# Patient Record
Sex: MALE | Race: WHITE | Employment: PART TIME
[De-identification: names, ages, dates, MRNs, and addresses within clinical notes are randomized per-mention and may not be internally consistent; named-entity substitution may affect disease eponyms.]

---

## 2022-06-16 ENCOUNTER — OFFICE VISIT (OUTPATIENT)
Dept: INTERNAL MEDICINE CLINIC | Facility: CLINIC | Age: 52
End: 2022-06-16
Payer: MEDICAID

## 2022-06-16 VITALS
TEMPERATURE: 96.8 F | DIASTOLIC BLOOD PRESSURE: 90 MMHG | HEART RATE: 100 BPM | OXYGEN SATURATION: 98 % | SYSTOLIC BLOOD PRESSURE: 130 MMHG | HEIGHT: 68 IN | WEIGHT: 170 LBS | BODY MASS INDEX: 25.76 KG/M2

## 2022-06-16 DIAGNOSIS — Z13.1 SCREENING FOR DIABETES MELLITUS: ICD-10-CM

## 2022-06-16 DIAGNOSIS — F51.01 PRIMARY INSOMNIA: ICD-10-CM

## 2022-06-16 DIAGNOSIS — C67.9 MALIGNANT NEOPLASM OF URINARY BLADDER, UNSPECIFIED SITE (HCC): ICD-10-CM

## 2022-06-16 DIAGNOSIS — Z12.11 COLON CANCER SCREENING: ICD-10-CM

## 2022-06-16 DIAGNOSIS — E66.3 OVERWEIGHT: ICD-10-CM

## 2022-06-16 DIAGNOSIS — Z11.59 ENCOUNTER FOR HEPATITIS C SCREENING TEST FOR LOW RISK PATIENT: ICD-10-CM

## 2022-06-16 DIAGNOSIS — Z78.9 ALCOHOL USE: ICD-10-CM

## 2022-06-16 DIAGNOSIS — R03.0 ELEVATED BLOOD PRESSURE READING: ICD-10-CM

## 2022-06-16 DIAGNOSIS — G89.29 CHRONIC LEFT SHOULDER PAIN: Primary | ICD-10-CM

## 2022-06-16 DIAGNOSIS — Z72.0 TOBACCO ABUSE: ICD-10-CM

## 2022-06-16 DIAGNOSIS — M25.512 CHRONIC LEFT SHOULDER PAIN: Primary | ICD-10-CM

## 2022-06-16 PROBLEM — F10.90 ALCOHOL USE: Status: ACTIVE | Noted: 2022-06-16

## 2022-06-16 PROCEDURE — 99204 OFFICE O/P NEW MOD 45 MIN: CPT | Performed by: NURSE PRACTITIONER

## 2022-06-16 ASSESSMENT — PATIENT HEALTH QUESTIONNAIRE - PHQ9
SUM OF ALL RESPONSES TO PHQ QUESTIONS 1-9: 0
1. LITTLE INTEREST OR PLEASURE IN DOING THINGS: 0
SUM OF ALL RESPONSES TO PHQ9 QUESTIONS 1 & 2: 0
2. FEELING DOWN, DEPRESSED OR HOPELESS: 0
SUM OF ALL RESPONSES TO PHQ QUESTIONS 1-9: 0

## 2022-06-16 ASSESSMENT — ANXIETY QUESTIONNAIRES
5. BEING SO RESTLESS THAT IT IS HARD TO SIT STILL: 2
3. WORRYING TOO MUCH ABOUT DIFFERENT THINGS: 3
6. BECOMING EASILY ANNOYED OR IRRITABLE: 2
GAD7 TOTAL SCORE: 13
2. NOT BEING ABLE TO STOP OR CONTROL WORRYING: 3
1. FEELING NERVOUS, ANXIOUS, OR ON EDGE: 0
IF YOU CHECKED OFF ANY PROBLEMS ON THIS QUESTIONNAIRE, HOW DIFFICULT HAVE THESE PROBLEMS MADE IT FOR YOU TO DO YOUR WORK, TAKE CARE OF THINGS AT HOME, OR GET ALONG WITH OTHER PEOPLE: SOMEWHAT DIFFICULT
7. FEELING AFRAID AS IF SOMETHING AWFUL MIGHT HAPPEN: 0
4. TROUBLE RELAXING: 3

## 2022-06-16 NOTE — PROGRESS NOTES
Doe Art (:  1970) is a 46 y.o. male,New patient, here for evaluation of the following chief complaint(s):  New Patient         ASSESSMENT/PLAN:  1. Chronic left shoulder pain  -     XR SHOULDER LEFT (MIN 2 VIEWS); Future  -     [facility]  -     Hemoglobin A1C  2. Screening for diabetes mellitus  -     Hemoglobin A1C  3. Elevated blood pressure reading  -     CBC with Auto Differential  -     Comprehensive Metabolic Panel  -     TSH  4. Overweight  -     Lipid Panel  5. Malignant neoplasm of urinary bladder, unspecified site [facility])  -     External Referral to Urology  6. Encounter for hepatitis C screening test for low risk patient  -     Hepatitis C Antibody  7. Colon cancer screening  -     POCT FECAL IMMUNOCHEMICAL TEST (FIT) ()  8. Tobacco abuse  9. Alcohol use      No follow-ups on file. Chronic left shoulder pain, limited overhead ROM  Get xray and refer to PT  Re-establish with urology to follow prior bladder cancer  Check lab  Counseled on tobacco cessation  Work on decreasing alcohol  Counseled on sleep hygiene  F.u after lab      Subjective   SUBJECTIVE/OBJECTIVE:  Patient is here for new patient appt. He has hx of bladder cancer. He no longer follows with oncology or urology. He had a bladder stone a few years ago - he had stone removed and was told there was no further evidence of cancer. He didn't do chemo. He has been having left shoulder pain, pinching sensation that is worsening. It is worse when he works, he is off this week and isn't so bad. He also can't sleep at night. He lays down and his mind starts wandering and won't shut off. It takes 4-5 hours to fall asleep.  He is up and down at night, gets up to eat, lays down, watches tv, lays back down, falls asleep in front of tv, gets in bed but then he is awake, etc.       Shoulder Pain   The pain is present in the right shoulder and left shoulder (right is just starting to hurt, left is bad). This is a chronic problem. The current episode started more than 1 year ago. There has been no history of extremity trauma. The problem occurs constantly. The problem has been gradually worsening. Quality: sharp pinching pain. Associated symptoms include a limited range of motion and stiffness. Pertinent negatives include no fever, joint locking, numbness or tingling. The symptoms are aggravated by activity (lifting and moving left arm). He has tried NSAIDS and acetaminophen for the symptoms. The treatment provided moderate relief. Review of Systems   Constitutional: Negative for fever. Musculoskeletal: Positive for stiffness. Neurological: Negative for tingling and numbness. Objective   Physical Exam  Constitutional:       Appearance: Normal appearance. He is not ill-appearing. HENT:      Head: Normocephalic. Right Ear: External ear normal.      Left Ear: External ear normal.   Eyes:      Extraocular Movements: Extraocular movements intact. Pupils: Pupils are equal, round, and reactive to light. Cardiovascular:      Rate and Rhythm: Normal rate and regular rhythm. Pulmonary:      Effort: Pulmonary effort is normal.      Breath sounds: Normal breath sounds. Musculoskeletal:         General: Tenderness present. Left shoulder: No tenderness or bony tenderness. Decreased range of motion. Normal strength. Normal pulse. Cervical back: Neck supple. Skin:     General: Skin is warm and dry. Neurological:      General: No focal deficit present. Mental Status: He is alert and oriented to person, place, and time. Psychiatric:         Mood and Affect: Mood normal.         Behavior: Behavior normal.                  An electronic signature was used to authenticate this note.     --LINDA Kunz - CNP

## 2022-06-17 LAB
ALBUMIN SERPL-MCNC: 3.8 G/DL (ref 3.5–5)
ALBUMIN/GLOB SERPL: 0.9 {RATIO} (ref 1.2–3.5)
ALP SERPL-CCNC: 160 U/L (ref 50–136)
ALT SERPL-CCNC: 110 U/L (ref 12–65)
ANION GAP SERPL CALC-SCNC: 10 MMOL/L (ref 7–16)
AST SERPL-CCNC: 133 U/L (ref 15–37)
BASOPHILS # BLD: 0.1 K/UL (ref 0–0.2)
BASOPHILS NFR BLD: 1 % (ref 0–2)
BILIRUB SERPL-MCNC: 1.5 MG/DL (ref 0.2–1.1)
BUN SERPL-MCNC: 7 MG/DL (ref 6–23)
CALCIUM SERPL-MCNC: 9.2 MG/DL (ref 8.3–10.4)
CHLORIDE SERPL-SCNC: 104 MMOL/L (ref 98–107)
CHOLEST SERPL-MCNC: 185 MG/DL
CO2 SERPL-SCNC: 25 MMOL/L (ref 21–32)
CREAT SERPL-MCNC: 0.9 MG/DL (ref 0.8–1.5)
DIFFERENTIAL METHOD BLD: ABNORMAL
EOSINOPHIL # BLD: 0.2 K/UL (ref 0–0.8)
EOSINOPHIL NFR BLD: 2 % (ref 0.5–7.8)
ERYTHROCYTE [DISTWIDTH] IN BLOOD BY AUTOMATED COUNT: 11.9 % (ref 11.9–14.6)
EST. AVERAGE GLUCOSE BLD GHB EST-MCNC: 103 MG/DL
GLOBULIN SER CALC-MCNC: 4.3 G/DL (ref 2.3–3.5)
GLUCOSE SERPL-MCNC: 107 MG/DL (ref 65–100)
HBA1C MFR BLD: 5.2 % (ref 4.2–6.3)
HCT VFR BLD AUTO: 49.1 % (ref 41.1–50.3)
HCV AB SER QL: REACTIVE
HDLC SERPL-MCNC: 23 MG/DL (ref 40–60)
HDLC SERPL: 8 {RATIO}
HGB BLD-MCNC: 16.8 G/DL (ref 13.6–17.2)
IMM GRANULOCYTES # BLD AUTO: 0 K/UL (ref 0–0.5)
IMM GRANULOCYTES NFR BLD AUTO: 0 % (ref 0–5)
LDLC SERPL CALC-MCNC: 128.6 MG/DL
LYMPHOCYTES # BLD: 2.2 K/UL (ref 0.5–4.6)
LYMPHOCYTES NFR BLD: 20 % (ref 13–44)
MCH RBC QN AUTO: 35.4 PG (ref 26.1–32.9)
MCHC RBC AUTO-ENTMCNC: 34.2 G/DL (ref 31.4–35)
MCV RBC AUTO: 103.4 FL (ref 79.6–97.8)
MONOCYTES # BLD: 1.2 K/UL (ref 0.1–1.3)
MONOCYTES NFR BLD: 11 % (ref 4–12)
NEUTS SEG # BLD: 7.3 K/UL (ref 1.7–8.2)
NEUTS SEG NFR BLD: 66 % (ref 43–78)
NRBC # BLD: 0 K/UL (ref 0–0.2)
PLATELET # BLD AUTO: 249 K/UL (ref 150–450)
PMV BLD AUTO: 11.5 FL (ref 9.4–12.3)
POTASSIUM SERPL-SCNC: 5.1 MMOL/L (ref 3.5–5.1)
PROT SERPL-MCNC: 8.1 G/DL (ref 6.3–8.2)
RBC # BLD AUTO: 4.75 M/UL (ref 4.23–5.6)
SODIUM SERPL-SCNC: 139 MMOL/L (ref 136–145)
TRIGL SERPL-MCNC: 167 MG/DL (ref 35–150)
TSH, 3RD GENERATION: 1.98 UIU/ML (ref 0.36–3.74)
VLDLC SERPL CALC-MCNC: 33.4 MG/DL (ref 6–23)
WBC # BLD AUTO: 11 K/UL (ref 4.3–11.1)

## 2022-06-20 DIAGNOSIS — R79.89 ELEVATED LIVER FUNCTION TESTS: ICD-10-CM

## 2022-06-20 DIAGNOSIS — R76.8 HEPATITIS C ANTIBODY TEST POSITIVE: Primary | ICD-10-CM

## 2022-06-20 DIAGNOSIS — M25.512 CHRONIC LEFT SHOULDER PAIN: ICD-10-CM

## 2022-06-20 DIAGNOSIS — G89.29 CHRONIC LEFT SHOULDER PAIN: ICD-10-CM

## 2022-06-20 DIAGNOSIS — Z78.9 ALCOHOL USE: ICD-10-CM

## 2022-06-20 PROCEDURE — 73030 X-RAY EXAM OF SHOULDER: CPT | Performed by: NURSE PRACTITIONER

## 2022-06-30 ENCOUNTER — OFFICE VISIT (OUTPATIENT)
Dept: INTERNAL MEDICINE CLINIC | Facility: CLINIC | Age: 52
End: 2022-06-30
Payer: MEDICAID

## 2022-06-30 VITALS
SYSTOLIC BLOOD PRESSURE: 140 MMHG | WEIGHT: 174 LBS | HEART RATE: 69 BPM | HEIGHT: 68 IN | DIASTOLIC BLOOD PRESSURE: 84 MMHG | BODY MASS INDEX: 26.37 KG/M2 | TEMPERATURE: 97.2 F | OXYGEN SATURATION: 98 %

## 2022-06-30 DIAGNOSIS — Z78.9 ALCOHOL USE: ICD-10-CM

## 2022-06-30 DIAGNOSIS — I10 PRIMARY HYPERTENSION: ICD-10-CM

## 2022-06-30 DIAGNOSIS — R76.8 HEPATITIS C ANTIBODY TEST POSITIVE: ICD-10-CM

## 2022-06-30 DIAGNOSIS — R74.8 LIVER ENZYME ELEVATION: ICD-10-CM

## 2022-06-30 DIAGNOSIS — C67.9 MALIGNANT NEOPLASM OF URINARY BLADDER, UNSPECIFIED SITE (HCC): Primary | ICD-10-CM

## 2022-06-30 DIAGNOSIS — E78.2 MIXED HYPERLIPIDEMIA: ICD-10-CM

## 2022-06-30 PROCEDURE — 99214 OFFICE O/P EST MOD 30 MIN: CPT | Performed by: NURSE PRACTITIONER

## 2022-06-30 RX ORDER — AMLODIPINE BESYLATE 2.5 MG/1
2.5 TABLET ORAL DAILY
Qty: 30 TABLET | Refills: 5 | Status: SHIPPED | OUTPATIENT
Start: 2022-06-30

## 2022-06-30 ASSESSMENT — PATIENT HEALTH QUESTIONNAIRE - PHQ9
SUM OF ALL RESPONSES TO PHQ QUESTIONS 1-9: 0
1. LITTLE INTEREST OR PLEASURE IN DOING THINGS: 0
SUM OF ALL RESPONSES TO PHQ QUESTIONS 1-9: 0
2. FEELING DOWN, DEPRESSED OR HOPELESS: 0
SUM OF ALL RESPONSES TO PHQ QUESTIONS 1-9: 0
SUM OF ALL RESPONSES TO PHQ QUESTIONS 1-9: 0
SUM OF ALL RESPONSES TO PHQ9 QUESTIONS 1 & 2: 0

## 2022-07-11 ENCOUNTER — COMMUNITY OUTREACH (OUTPATIENT)
Dept: INTERNAL MEDICINE CLINIC | Facility: CLINIC | Age: 52
End: 2022-07-11

## 2022-07-11 NOTE — PROGRESS NOTES
Patient's HM shows they are overdue for Colorectal Screening. Care Everywhere and  files searched without success. No results to attach to order nor HM updated. FIT ordered.

## 2022-07-19 ENCOUNTER — HOSPITAL ENCOUNTER (OUTPATIENT)
Dept: PHYSICAL THERAPY | Age: 52
Setting detail: RECURRING SERIES
Discharge: HOME | End: 2022-07-22
Payer: MEDICAID

## 2022-07-19 PROCEDURE — 97162 PT EVAL MOD COMPLEX 30 MIN: CPT

## 2022-07-19 ASSESSMENT — PAIN SCALES - GENERAL: PAINLEVEL_OUTOF10: 0

## 2022-07-19 NOTE — PROGRESS NOTES
Morgan Alvarado  : 1970  [place]  Secondary:  [address] [address] [facility] [address]  Phone: [phone]  Fax: [phone] No data recorded  No data recorded    PT Visit Info: Total # of Visits to Date: 1    OUTPATIENT PHYSICAL THERAPY:OP NOTE TYPE: Treatment Note 2022       Episode  }Appt Desk              Treatment Diagnosis:  Pain in Left Shoulder (M25.512)  Stiffness of Left Shoulder, Not elsewhere classified (M25.612)  Medical/Referring Diagnosis:  Chronic left shoulder pain [M25.512, G89.29]  Referring Physician:  Erica Brown, *  MD Orders:  PT Eval and Treat   Date of Onset:  No data recorded   Allergies:   Patient has no known allergies. Restrictions/Precautions:  No data recordedNo data recorded   Interventions Planned (Treatment may consist of any combination of the following):    No data recorded   Subjective Comments:see initial evaluation     Initial:}    0/10Post Qa7seetm:       0/10  Medications Last Reviewed:  2022  Updated Objective Findings:   see evaluation   Treatment   THERAPEUTIC ACTIVITY: (see below for minutes): Therapeutic activities below to improve mobility, strength, balance and coordination. Required minimal visual, verbal, manual and tactile cues to improve independence and safety with daily activities. THERAPEUTIC EXERCISE: (see below for minutes): Exercises below to improve mobility, strength, balance and coordination. Required minimal verbal and manual cues to promote proper body alignment, promote proper body posture and promote proper body mechanics. Progressed resistance, range, repetitions and complexity of movement as indicated. NEUROMUSCULAR RE-ED: (see below for minutes): Neuromuscular re-education to restore normal body movement patterns.   MANUAL THERAPY: (see below for minutes): Joint mobilization and Soft tissue mobilization was utilized and necessary because of the patient's

## 2022-07-19 NOTE — PLAN OF CARE
Constance Alvaro  : 1970  [place]  Secondary:  Dax Esquivel @ [address] [facility] [address]  Phone: [phone]  Fax: [phone] Plan Frequency: 2x/week for 6-8 weeks  Plan of Care/Certification Expiration Date: 22    PT Visit Info: Total # of Visits to Date: 1    OUTPATIENT PHYSICAL THERAPY:OP NOTE TYPE: Initial Assessment and Discharge Summary 2022               Episode  Appt Desk         Treatment Diagnosis:  Pain in Left Shoulder (M25.512)  Stiffness of Left Shoulder, Not elsewhere classified (M25.612)  * No diagnoses found *  Medical/Referring Diagnosis:  Chronic left shoulder pain [M25.512, G89.29]  Referring Physician:  Chucho Rosa, *  MD Orders:  PT Eval and Treat   Return MD Appt:    Date of Onset:  No data recorded   Allergies:  Patient has no known allergies. Restrictions/Precautions:    No data recordedNo data recorded   Medications Last Reviewed:  2022     SUBJECTIVE   History of Injury/Illness (Reason for Referral): Pt presents with c/o chronic L shoulder pain that has been going on for a few years. 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 Sleep hurts when lying on L, especially with arm outstretched. The pain is worst after working overhead for a day. Pain ranges 0-8/10 but sleep is always problematic. His shoulder sometimes catches. Patient Stated Goal(s):  \"ease pain\"  Initial:     0/10 Post Session:     0/10  Past Medical History/Comorbidities:   Mr. Dora Hansen  has no past medical history on file. Mr. Dora Hansen  has a past surgical history that includes Bladder stone removal and other surgical history.   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 male who presents to Physical Therapy with signs and symptoms consistent with diagnosis of L shoulder pain and stiffness. Patient presents today with limited ROM, inc pain, dec joint mobility and scapular control. These deficits limit the patients ability to perform these tasks: work duties (extensive overhead tasks), sleeping on L side. Patient will benefit from skilled therapy to allow the patient to meet set goals and return to PLOF, in which the above noted functional activities were not impaired. Problem List: (Impacting functional limitations): Body Structures, Functions, Activity Limitations Requiring Skilled Therapeutic Intervention: Decreased functional mobility ; Decreased ROM; Decreased strength; Decreased endurance; Increased pain   Therapy Prognosis:   Therapy Prognosis: Good   Assessment Complexity:   Medium Complexity  PLAN   Effective Dates: 07/19/22   TO Plan of Care/Certification Expiration Date: 09/19/22   Frequency/Duration: Plan Frequency: 2x/week for 6-8 weeks   Interventions Planned (Treatment may consist of any combination of the following):    Current Treatment Recommendations: Strengthening; ROM; Functional mobility training;  Endurance training; Neuromuscular re-education; Manual Therapy - Soft Tissue Mobilization; Home exercise program; Therapeutic activities   Goals: (Goals have been discussed and agreed upon with patient.)  Short-Term Functional Goals: Time Frame: 4 weeks  Shoulder flx within 5 deg of contralateral for improved reaching  Max pain 6/10 for improved sleep  Discharge Goals: Time Frame: 8 weeks  Max pain 4/10 for improved sleep  Independent with HEP         Outcome Measure: Tool Used: Disabilities of the Arm, Shoulder and Hand (DASH) Questionnaire - Quick Version  7/19/22: 25/100    Medical Necessity:   Patient demonstrates   good   rehab potential due to higher previous functional level. Reason For Services/Other Comments:  Patient continues to require skilled intervention due to limited ROM, endurance/motor control, inc pain. Plan: (addendum 8/30) Discharge. Pt canceled only f/u appointment and has not called to reschedule. Plan to discharge. Total Duration:  Time In: 0930  Time Out: 12    Regarding Letty Toro therapy, I certify that the treatment plan above will be carried out by a therapist or under their direction.   Thank you for this referral,  Robyn Gordillo, PT     Referring Physician Signature: Chucho Rosa, * _______________________________ Date _____________        Post Session Pain  Charge Capture  PT Visit Info  POC Link  Treatment Note Link  MD Guidelines  [email or web address]

## 2022-07-20 DIAGNOSIS — Z12.11 SCREENING FOR COLON CANCER: Primary | ICD-10-CM

## 2022-08-25 ENCOUNTER — PREP FOR PROCEDURE (OUTPATIENT)
Dept: ADMINISTRATIVE | Age: 52
End: 2022-08-25

## 2022-09-03 RX ORDER — SODIUM CHLORIDE 9 MG/ML
INJECTION, SOLUTION INTRAVENOUS PRN
Status: CANCELLED | OUTPATIENT
Start: 2022-09-03

## 2022-09-03 RX ORDER — SODIUM CHLORIDE 0.9 % (FLUSH) 0.9 %
5-40 SYRINGE (ML) INJECTION PRN
Status: CANCELLED | OUTPATIENT
Start: 2022-09-03

## 2022-09-03 RX ORDER — SODIUM CHLORIDE 0.9 % (FLUSH) 0.9 %
5-40 SYRINGE (ML) INJECTION EVERY 12 HOURS SCHEDULED
Status: CANCELLED | OUTPATIENT
Start: 2022-09-03

## 2022-09-12 ENCOUNTER — HOSPITAL ENCOUNTER (OUTPATIENT)
Dept: ULTRASOUND IMAGING | Age: 52
Discharge: HOME | End: 2022-09-15
Payer: MEDICAID

## 2022-09-12 VITALS
TEMPERATURE: 97.2 F | BODY MASS INDEX: 26.37 KG/M2 | RESPIRATION RATE: 16 BRPM | DIASTOLIC BLOOD PRESSURE: 72 MMHG | HEART RATE: 58 BPM | WEIGHT: 174 LBS | SYSTOLIC BLOOD PRESSURE: 127 MMHG | HEIGHT: 68 IN | OXYGEN SATURATION: 97 %

## 2022-09-12 DIAGNOSIS — K70.30 ALCOHOLIC CIRRHOSIS, UNSPECIFIED WHETHER ASCITES PRESENT (HCC): ICD-10-CM

## 2022-09-12 PROCEDURE — 2720000010 US BIOPSY LIVER PERCUTANEOUS

## 2022-09-12 PROCEDURE — 88312 SPECIAL STAINS GROUP 1: CPT

## 2022-09-12 PROCEDURE — 47000 NEEDLE BIOPSY OF LIVER PERQ: CPT | Performed by: INTERNAL MEDICINE

## 2022-09-12 PROCEDURE — 88313 SPECIAL STAINS GROUP 2: CPT

## 2022-09-12 PROCEDURE — 88307 TISSUE EXAM BY PATHOLOGIST: CPT

## 2022-09-12 PROCEDURE — 6360000002 HC RX W HCPCS: Performed by: RADIOLOGY

## 2022-09-12 RX ORDER — ONDANSETRON 2 MG/ML
4 INJECTION INTRAMUSCULAR; INTRAVENOUS EVERY 8 HOURS PRN
Status: DISCONTINUED | OUTPATIENT
Start: 2022-09-12 | End: 2022-09-16 | Stop reason: HOSPADM

## 2022-09-12 RX ORDER — OXYCODONE HYDROCHLORIDE 5 MG/1
5 TABLET ORAL EVERY 4 HOURS PRN
Status: DISCONTINUED | OUTPATIENT
Start: 2022-09-12 | End: 2022-09-16 | Stop reason: HOSPADM

## 2022-09-12 RX ORDER — MIDAZOLAM HYDROCHLORIDE 2 MG/2ML
INJECTION, SOLUTION INTRAMUSCULAR; INTRAVENOUS
Status: COMPLETED | OUTPATIENT
Start: 2022-09-12 | End: 2022-09-12

## 2022-09-12 RX ORDER — FENTANYL CITRATE 50 UG/ML
INJECTION, SOLUTION INTRAMUSCULAR; INTRAVENOUS
Status: COMPLETED | OUTPATIENT
Start: 2022-09-12 | End: 2022-09-12

## 2022-09-12 RX ORDER — OXYCODONE HYDROCHLORIDE 5 MG/1
10 TABLET ORAL EVERY 4 HOURS PRN
Status: DISCONTINUED | OUTPATIENT
Start: 2022-09-12 | End: 2022-09-16 | Stop reason: HOSPADM

## 2022-09-12 RX ORDER — SODIUM CHLORIDE 9 MG/ML
INJECTION, SOLUTION INTRAVENOUS CONTINUOUS
Status: DISCONTINUED | OUTPATIENT
Start: 2022-09-12 | End: 2022-09-16 | Stop reason: HOSPADM

## 2022-09-12 RX ADMIN — FENTANYL CITRATE 50 MCG: 50 INJECTION, SOLUTION INTRAMUSCULAR; INTRAVENOUS at 08:28

## 2022-09-12 RX ADMIN — MIDAZOLAM HYDROCHLORIDE 1 MG: 1 INJECTION, SOLUTION INTRAMUSCULAR; INTRAVENOUS at 08:25

## 2022-09-12 RX ADMIN — FENTANYL CITRATE 50 MCG: 50 INJECTION, SOLUTION INTRAMUSCULAR; INTRAVENOUS at 08:25

## 2022-09-12 RX ADMIN — MIDAZOLAM HYDROCHLORIDE 1 MG: 1 INJECTION, SOLUTION INTRAMUSCULAR; INTRAVENOUS at 08:28

## 2022-09-12 RX ADMIN — FENTANYL CITRATE 50 MCG: 50 INJECTION, SOLUTION INTRAMUSCULAR; INTRAVENOUS at 08:33

## 2022-09-12 RX ADMIN — MIDAZOLAM HYDROCHLORIDE 1 MG: 1 INJECTION, SOLUTION INTRAMUSCULAR; INTRAVENOUS at 08:33

## 2022-09-12 ASSESSMENT — PAIN - FUNCTIONAL ASSESSMENT: PAIN_FUNCTIONAL_ASSESSMENT: NONE - DENIES PAIN

## 2022-09-12 NOTE — PROGRESS NOTES
Recovery period without difficulty. Pt alert and oriented and denies pain. Dressing is clean, dry, and intact. Reviewed discharge instructions with patient and family, both verbalized understanding. Pt escorted to lobby discharge area via wheelchair. Vital signs and Myra score completed.

## 2022-09-12 NOTE — BRIEF OP NOTE
Department of Interventional Radiology  [phone]        Interventional Radiology Brief Procedure Note    Patient: Isabelle De Leon MRN: [id number]  SSN: [id number]    YOB: 1970  Age: 46 y.o. Sex: male      Date of Procedure: 9/12/2022    Pre-Procedure Diagnosis: Cirrhosis. Portal hypertension. Hep C. Post-Procedure Diagnosis: SAME    Procedure(s): Image Guided Biopsy    Brief Description of Procedure: Liver, left lobe    Performed By: Brooke Swenson MD     Assistants: None    Anesthesia:Moderate Sedation    Estimated Blood Loss: Less than 10ml    Specimens:  Pathology    Implants:  None    Findings: Unremarkable.      Complications: None    Recommendations: 2 hour bedrest.       Follow Up: PRN    Signed By: Brooke Swenson MD     September 12, 2022

## 2022-09-12 NOTE — PRE SEDATION
Sedation Pre-Procedure Note    Patient Name: Aldo Kilgore   YOB: 1970  Room/Bed: Room/bed info not found  Medical Record Number: [id number]  Date: 9/12/2022   Time: 7:55 AM       Indication: Ultrasound guided percutaneous random liver biopsy for persistently elevated liver enzymes    Consent: I have discussed with the patient and/or the patient representative the indication, alternatives, and the possible risks and/or complications of the planned procedure and the anesthesia methods. The patient and/or patient representative appear to understand and agree to proceed. Vital Signs:   Vitals:    09/12/22 0719   BP: 119/73   Pulse: 59   Resp: 18   Temp: 97.2 °F (36.2 °C)   SpO2: 97%       Past Medical History:   has no past medical history on file. Past Surgical History:   has a past surgical history that includes Bladder stone removal and other surgical history. Medications:   Scheduled Meds:   Continuous Infusions:   PRN Meds:   Home Meds:   Prior to Admission medications    Medication Sig Start Date End Date Taking? Authorizing Provider   amLODIPine (NORVASC) 2.5 MG tablet Take 1 tablet by mouth daily 6/30/22   LINDA Messina - TERENCE          Pre-Sedation Documentation and Exam:   I have personally completed a history, physical exam & review of systems for this patient (see notes). Vital signs have been reviewed (see flow sheet for vitals).     Mallampati Airway Assessment:  normal, dentition not prohibitive, Mallampati Class III - (soft palate & base of uvula are visible)    Prior History of Anesthesia Complications:   none    ASA Classification:  Class 3 - A patient with severe systemic disease that limits activity but is not incapacitating    Sedation/ Anesthesia Plan:   intravenous sedation    Medications Planned:   midazolam (Versed) intravenously and fentanyl intravenously    Patient is an appropriate candidate for plan of sedation: yes    Electronically signed by Oanh Santana LIBIA Lane on 9/12/2022 at 7:55 AM

## 2022-09-12 NOTE — DISCHARGE INSTRUCTIONS
If you have any questions about your procedure, please call the Interventional Radiology department at [phone]. After business hours (5pm) and weekends, call the answering service at [phone] and ask for the Radiologist on call to be paged. Si tiene Preguntas acerca del procedimiento, por favor llame al departamento de Radiología Intervencional al [phone]. Después de horas de oficina (5 pm) y los fines de [place], llamar al Janice Curtis al [phone] y pregunte por el Radiologo de [facility]. Interventional Radiology General Nurse Discharge    After general anesthesia or intravenous sedation, for 24 hours or while taking prescription Narcotics:  Limit your activities  Do not drive and operate hazardous machinery  Do not make important personal or business decisions  Do  not drink alcoholic beverages  If you have not urinated within 8 hours after discharge, please contact your surgeon on call. * Please give a list of your current medications to your Primary Care Provider. * Please update this list whenever your medications are discontinued, doses are     changed, or new medications (including over-the-counter products) are added. * Please carry medication information at all times in case of emergency situations. These are general instructions for a healthy lifestyle:    No smoking/ No tobacco products/ Avoid exposure to second hand smoke  Surgeon General's Warning:  Quitting smoking now greatly reduces serious risk to your health.     Obesity, smoking, and sedentary lifestyle greatly increases your risk for illness  A healthy diet, regular physical exercise & weight monitoring are important for maintaining a healthy lifestyle    You may be retaining fluid if you have a history of heart failure or if you experience any of the following symptoms:  Weight gain of 3 pounds or more overnight or 5 pounds in a week, increased swelling in our hands or feet or shortness of breath while lying flat in bed. Please call your doctor as soon as you notice any of these symptoms; do not wait until your next office visit. Recognize signs and symptoms of STROKE:  F-face looks uneven    A-arms unable to move or move unevenly    S-speech slurred or non-existent    T-time-call 911 as soon as signs and symptoms begin-DO NOT go       Back to bed or wait to see if you get better-TIME IS BRAIN.

## 2022-09-12 NOTE — H&P
Department of Interventional Radiology  [phone]    History and Physical    Patient:  Winifred Marte MRN:  [id number]  SSN:  [id number]    YOB: 1970  Age:  46 y.o. Sex:  male      Primary Care Provider: LINDA Miranda CNP  Referring Physician:  Carlos Torres MD    Subjective:     Chief Complaint: Elevated liver enzymes    History of the Present Illness: The patient is a 46 y.o. male who presents for ultrasound guided percutaneous random liver biopsy. Patient was referred by his GI physician Dr. Mary Ramon. He had a CT scan of his liver on 8-25 which showed some stable hemangiomas as well as findings of probable early cirrhosis. Patient has a history of heavy alcohol use, bladder neoplasm, hepatitis C, hypertension, and hyperlipidemia. He is NPO. He denies taking any blood thinners. History reviewed. No pertinent past medical history. Past Surgical History:   Procedure Laterality Date    BLADDER STONE REMOVAL      OTHER SURGICAL HISTORY      bladder cancer        Review of Systems:    Pertinent items are noted in HPI. Prior to Admission medications    Medication Sig Start Date End Date Taking? Authorizing Provider   amLODIPine (NORVASC) 2.5 MG tablet Take 1 tablet by mouth daily 6/30/22   LINDA Miranda CNP        No Known Allergies    Family History   Problem Relation Age of Onset    No Known Problems Mother     Esophageal Cancer Father     No Known Problems Sister     Diabetes Paternal Uncle     Heart Disease Neg Hx     Stroke Neg Hx      Social History     Tobacco Use    Smoking status: Every Day     Packs/day: 1.00     Years: 40.00     Pack years: 40.00     Types: Cigarettes    Smokeless tobacco: Never   Substance Use Topics    Alcohol use: Not Currently     Alcohol/week: 24.0 standard drinks     Types: 24 Cans of beer per week        Not in a hospital admission.     Objective:       Physical Examination:    Vitals:    09/12/22 0719 09/12/22 0721   BP: 119/73    Pulse: 59    Resp: 18    Temp: 97.2 °F (36.2 °C)    TempSrc: Temporal    SpO2: 97%    Weight:  174 lb (78.9 kg)   Height:  5' 8\" (1.727 m)       Pain Assessment               0                                      HEART: regular rate and rhythm, S1, S2 normal, no murmur, click, rub or gallop  LUNG: clear to auscultation bilaterally  ABDOMEN: soft, non-tender; bowel sounds normal; no masses,  no organomegaly  EXTREMITIES: No pedal edema    Laboratory:     Lab Results   Component Value Date/Time     06/16/2022 10:17 AM    K 5.1 06/16/2022 10:17 AM     06/16/2022 10:17 AM    CO2 25 06/16/2022 10:17 AM    BUN 7 06/16/2022 10:17 AM    GFRAA >60 06/16/2022 10:17 AM    GLOB 4.3 06/16/2022 10:17 AM     06/16/2022 10:17 AM     Lab Results   Component Value Date/Time    WBC 11.0 06/16/2022 10:17 AM    HGB 16.8 06/16/2022 10:17 AM    HCT 49.1 06/16/2022 10:17 AM     06/16/2022 10:17 AM     No results found for: APTT, INR    Assessment:     Elevated liver enzymes in the setting of alcohol use and probable early cirrhosis with history of hepatitis C    [unfilled]    Plan:     Planned Procedure: Ultrasound guided percutaneous random liver biopsy under moderate sedation    Risks, benefits, and alternatives reviewed with patient and he agrees to proceed with the procedure.       Signed By: LIBIA Steele     September 12, 2022

## 2022-10-12 NOTE — PERIOP NOTE
Patient verified name, , and procedure. Type: 1a; abbreviated assessment per anesthesia guidelines    Labs per anesthesia: none    Instructed pt that they will be notified the day before their procedure by the GI Lab for time of arrival if their procedure is [facility] and Pre-op for [facility] [place] [address] [facility]. Arrival times should be called by 5 pm. If no phone is received the patient should contact their respective hospital. The GI lab telephone number is [phone] and ES Pre-op is [phone]. Follow diet and prep instructions per office including NPO status. If patient has NOT received instructions from office patient is advised to call surgeon office, verbalizes understanding. Bath or shower the night before and the am of surgery with non-mositurizing soap. No lotions, oils, powders, cologne on skin. No make up, eye make up or jewelry. Wear loose fitting comfortable, clean clothing. Must have adult present in building the entire time . Medications for the day of procedure amlodipine, patient to hold none    The following discharge instructions reviewed with patient: medication given during procedure may cause drowsiness for several hours, therefore, do not drive or operate machinery for remainder of the day. You may not drink alcohol on the day of your procedure, please resume regular diet and activity unless otherwise directed. You may experience abdominal distention for several hours that is relieved by the passage of gas. Contact your physician if you have any of the following: fever or chills, severe abdominal pain or excessive amount of bleeding or a large amount when having a bowel movement.  Occasional specks of blood with bowel movement would not be unusual.

## 2022-10-13 NOTE — PROGRESS NOTES
Spoke with pt spouse regarding tomorrow's procedure. Pt spouse verbalized understanding of early arrival time as well as  policy.

## 2022-10-14 ENCOUNTER — HOSPITAL ENCOUNTER (OUTPATIENT)
Age: 52
Setting detail: OUTPATIENT SURGERY
Discharge: HOME | End: 2022-10-14
Attending: INTERNAL MEDICINE | Admitting: INTERNAL MEDICINE
Payer: MEDICAID

## 2022-10-14 ENCOUNTER — ANESTHESIA (OUTPATIENT)
Dept: ENDOSCOPY | Age: 52
End: 2022-10-14
Payer: MEDICAID

## 2022-10-14 ENCOUNTER — ANESTHESIA EVENT (OUTPATIENT)
Dept: ENDOSCOPY | Age: 52
End: 2022-10-14
Payer: MEDICAID

## 2022-10-14 VITALS
HEIGHT: 68 IN | SYSTOLIC BLOOD PRESSURE: 147 MMHG | TEMPERATURE: 96.8 F | RESPIRATION RATE: 17 BRPM | DIASTOLIC BLOOD PRESSURE: 88 MMHG | WEIGHT: 180 LBS | BODY MASS INDEX: 27.28 KG/M2 | OXYGEN SATURATION: 100 % | HEART RATE: 47 BPM

## 2022-10-14 PROCEDURE — 88305 TISSUE EXAM BY PATHOLOGIST: CPT

## 2022-10-14 PROCEDURE — 7100000010 HC PHASE II RECOVERY - FIRST 15 MIN: Performed by: INTERNAL MEDICINE

## 2022-10-14 PROCEDURE — 2500000003 HC RX 250 WO HCPCS: Performed by: NURSE ANESTHETIST, CERTIFIED REGISTERED

## 2022-10-14 PROCEDURE — 3700000000 HC ANESTHESIA ATTENDED CARE: Performed by: INTERNAL MEDICINE

## 2022-10-14 PROCEDURE — 88312 SPECIAL STAINS GROUP 1: CPT

## 2022-10-14 PROCEDURE — 7100000011 HC PHASE II RECOVERY - ADDTL 15 MIN: Performed by: INTERNAL MEDICINE

## 2022-10-14 PROCEDURE — 3700000001 HC ADD 15 MINUTES (ANESTHESIA): Performed by: INTERNAL MEDICINE

## 2022-10-14 PROCEDURE — 3609012400 HC EGD TRANSORAL BIOPSY SINGLE/MULTIPLE: Performed by: INTERNAL MEDICINE

## 2022-10-14 PROCEDURE — 2709999900 HC NON-CHARGEABLE SUPPLY: Performed by: INTERNAL MEDICINE

## 2022-10-14 PROCEDURE — 6360000002 HC RX W HCPCS: Performed by: NURSE ANESTHETIST, CERTIFIED REGISTERED

## 2022-10-14 PROCEDURE — 2580000003 HC RX 258: Performed by: INTERNAL MEDICINE

## 2022-10-14 PROCEDURE — 3609010600 HC COLONOSCOPY POLYPECTOMY SNARE/COLD BIOPSY: Performed by: INTERNAL MEDICINE

## 2022-10-14 RX ORDER — PROPOFOL 10 MG/ML
INJECTION, EMULSION INTRAVENOUS PRN
Status: DISCONTINUED | OUTPATIENT
Start: 2022-10-14 | End: 2022-10-14 | Stop reason: SDUPTHER

## 2022-10-14 RX ORDER — SODIUM CHLORIDE 0.9 % (FLUSH) 0.9 %
5-40 SYRINGE (ML) INJECTION PRN
Status: DISCONTINUED | OUTPATIENT
Start: 2022-10-14 | End: 2022-10-14 | Stop reason: HOSPADM

## 2022-10-14 RX ORDER — SODIUM CHLORIDE 0.9 % (FLUSH) 0.9 %
5-40 SYRINGE (ML) INJECTION EVERY 12 HOURS SCHEDULED
Status: DISCONTINUED | OUTPATIENT
Start: 2022-10-14 | End: 2022-10-14 | Stop reason: HOSPADM

## 2022-10-14 RX ORDER — LIDOCAINE HYDROCHLORIDE 20 MG/ML
INJECTION, SOLUTION EPIDURAL; INFILTRATION; INTRACAUDAL; PERINEURAL PRN
Status: DISCONTINUED | OUTPATIENT
Start: 2022-10-14 | End: 2022-10-14 | Stop reason: SDUPTHER

## 2022-10-14 RX ORDER — SODIUM CHLORIDE, SODIUM LACTATE, POTASSIUM CHLORIDE, CALCIUM CHLORIDE 600; 310; 30; 20 MG/100ML; MG/100ML; MG/100ML; MG/100ML
INJECTION, SOLUTION INTRAVENOUS CONTINUOUS
Status: DISCONTINUED | OUTPATIENT
Start: 2022-10-14 | End: 2022-10-14 | Stop reason: HOSPADM

## 2022-10-14 RX ORDER — SODIUM CHLORIDE 9 MG/ML
INJECTION, SOLUTION INTRAVENOUS PRN
Status: DISCONTINUED | OUTPATIENT
Start: 2022-10-14 | End: 2022-10-14 | Stop reason: HOSPADM

## 2022-10-14 RX ORDER — PROPOFOL 10 MG/ML
INJECTION, EMULSION INTRAVENOUS CONTINUOUS PRN
Status: DISCONTINUED | OUTPATIENT
Start: 2022-10-14 | End: 2022-10-14 | Stop reason: SDUPTHER

## 2022-10-14 RX ADMIN — PROPOFOL 20 MG: 10 INJECTION, EMULSION INTRAVENOUS at 09:46

## 2022-10-14 RX ADMIN — PROPOFOL 50 MG: 10 INJECTION, EMULSION INTRAVENOUS at 09:35

## 2022-10-14 RX ADMIN — PROPOFOL 20 MG: 10 INJECTION, EMULSION INTRAVENOUS at 09:44

## 2022-10-14 RX ADMIN — PROPOFOL 20 MG: 10 INJECTION, EMULSION INTRAVENOUS at 09:50

## 2022-10-14 RX ADMIN — LIDOCAINE HYDROCHLORIDE 40 MG: 20 INJECTION, SOLUTION EPIDURAL; INFILTRATION; INTRACAUDAL; PERINEURAL at 09:33

## 2022-10-14 RX ADMIN — PROPOFOL 50 MG: 10 INJECTION, EMULSION INTRAVENOUS at 09:33

## 2022-10-14 RX ADMIN — SODIUM CHLORIDE, POTASSIUM CHLORIDE, SODIUM LACTATE AND CALCIUM CHLORIDE: 600; 310; 30; 20 INJECTION, SOLUTION INTRAVENOUS at 07:55

## 2022-10-14 RX ADMIN — PROPOFOL 140 MCG/KG/MIN: 10 INJECTION, EMULSION INTRAVENOUS at 09:33

## 2022-10-14 ASSESSMENT — PAIN - FUNCTIONAL ASSESSMENT: PAIN_FUNCTIONAL_ASSESSMENT: NONE - DENIES PAIN

## 2022-10-14 NOTE — DISCHARGE INSTRUCTIONS
you with biopsy and polyp results. Next colonoscopy timing will be based on polyp results but plan for 3-5 years. Instructions given to Anisha Freeman and other family members. Instructions given to Anisha Freeman and other family members.

## 2022-10-14 NOTE — ANESTHESIA PRE PROCEDURE
Department of Anesthesiology  Preprocedure Note       Name:  Stephie Resendiz   Age:  46 y.o.  :  1970                                          MRN:  [id number]         Date:  10/14/2022      Surgeon: Dg Cade):  Lynette Mo MD    Procedure: Procedure(s):  EGD ESOPHAGOGASTRODUODENOSCOPY  COLORECTAL CANCER SCREENING, NOT HIGH RISK/27    Medications prior to admission:   Prior to Admission medications    Medication Sig Start Date End Date Taking?  Authorizing Provider   amLODIPine (NORVASC) 2.5 MG tablet Take 1 tablet by mouth daily 22   LINDA Mcqueen - CNP       Current medications:    Current Facility-Administered Medications   Medication Dose Route Frequency Provider Last Rate Last Admin    sodium chloride flush 0.9 % injection 5-40 mL  5-40 mL IntraVENous 2 times per day Lynette Mo MD        sodium chloride flush 0.9 % injection 5-40 mL  5-40 mL IntraVENous PRN Lynette Mo MD        0.9 % sodium chloride infusion   IntraVENous PRN Lynette Mo MD        lactated ringers infusion   IntraVENous Continuous Lynette Mo  mL/hr at 10/14/22 0755 New Bag at 10/14/22 0755       Allergies:  No Known Allergies    Problem List:    Patient Active Problem List   Diagnosis Code    Alcohol use Z78.9    Chronic left shoulder pain M25.512, G89.29    Malignant neoplasm of urinary bladder (HCC) C67.9    Overweight E66.3    Primary insomnia F51.01    Primary hypertension I10    Mixed hyperlipidemia E78.2    Liver enzyme elevation R74.8    Hepatitis C antibody test positive R76.8       Past Medical History:        Diagnosis Date    Hepatitis C     History of bladder cancer     HTN (hypertension)        Past Surgical History:        Procedure Laterality Date    BLADDER STONE REMOVAL      OTHER SURGICAL HISTORY      bladder cancer    US GUIDED LIVER BIOPSY PERCUTANEOUS  2022    US GUIDED LIVER BIOPSY PERCUTANEOUS 2022 [facility] [address] Social History:    Social History     Tobacco Use    Smoking status: Every Day     Packs/day: 1.00     Years: 40.00     Pack years: 40.00     Types: Cigarettes    Smokeless tobacco: Never   Substance Use Topics    Alcohol use: Yes     Alcohol/week: 24.0 standard drinks     Types: 24 Cans of beer per week     Comment: occasional                                Ready to quit: Not Answered  Counseling given: Not Answered      Vital Signs (Current):   Vitals:    10/12/22 0908 10/14/22 0748   BP:  138/77   Pulse:  51   Resp:  15   Temp:  97.8 °F (36.6 °C)   TempSrc:  Oral   SpO2:  97%   Weight: 180 lb (81.6 kg)    Height: 5' 8\" (1.727 m)                                               BP Readings from Last 3 Encounters:   10/14/22 138/77   09/12/22 127/72   06/30/22 (!) 140/84       NPO Status: Time of last liquid consumption: 0200                        Time of last solid consumption: 2000                        Date of last liquid consumption: 10/14/22                        Date of last solid food consumption: 10/12/22    BMI:   Wt Readings from Last 3 Encounters:   10/12/22 180 lb (81.6 kg)   09/12/22 174 lb (78.9 kg)   06/30/22 174 lb (78.9 kg)     Body mass index is 27.37 kg/m².     CBC:   Lab Results   Component Value Date/Time    WBC 11.0 06/16/2022 10:17 AM    RBC 4.75 06/16/2022 10:17 AM    HGB 16.8 06/16/2022 10:17 AM    HCT 49.1 06/16/2022 10:17 AM    .4 06/16/2022 10:17 AM    RDW 11.9 06/16/2022 10:17 AM     06/16/2022 10:17 AM       CMP:   Lab Results   Component Value Date/Time     06/16/2022 10:17 AM    K 5.1 06/16/2022 10:17 AM     06/16/2022 10:17 AM    CO2 25 06/16/2022 10:17 AM    BUN 7 06/16/2022 10:17 AM    CREATININE 0.90 06/16/2022 10:17 AM    GFRAA >60 06/16/2022 10:17 AM    LABGLOM >60 06/16/2022 10:17 AM    GLUCOSE 107 06/16/2022 10:17 AM    PROT 8.1 06/16/2022 10:17 AM    CALCIUM 9.2 06/16/2022 10:17 AM    BILITOT 1.5 06/16/2022 10:17 AM    ALKPHOS 160 06/16/2022 10:17 AM     06/16/2022 10:17 AM     06/16/2022 10:17 AM       POC Tests: No results for input(s): POCGLU, POCNA, POCK, POCCL, POCBUN, POCHEMO, POCHCT in the last 72 hours. Coags: No results found for: PROTIME, INR, APTT    HCG (If Applicable): No results found for: PREGTESTUR, PREGSERUM, HCG, HCGQUANT     ABGs: No results found for: PHART, PO2ART, PUX5CXF, SCP1QWT, BEART, Y8LUHROU     Type & Screen (If Applicable):  No results found for: LABABO, LABRH    Drug/Infectious Status (If Applicable):  Lab Results   Component Value Date/Time    HEPCAB REACTIVE 06/16/2022 10:17 AM       COVID-19 Screening (If Applicable): No results found for: COVID19        Anesthesia Evaluation  Patient summary reviewed and Nursing notes reviewed  Airway: Mallampati: II  TM distance: >3 FB   Neck ROM: full  Mouth opening: > = 3 FB   Dental: normal exam         Pulmonary:Negative Pulmonary ROS and normal exam  breath sounds clear to auscultation                             Cardiovascular:Negative CV ROS  Exercise tolerance: good (>4 METS),   (+) hypertension:,         Rhythm: regular  Rate: normal                    Neuro/Psych:   Negative Neuro/Psych ROS              GI/Hepatic/Renal:   (+) hepatitis: C, liver disease:,           Endo/Other:                     Abdominal:             Vascular: negative vascular ROS. Other Findings:           Anesthesia Plan      TIVA     ASA 3       Induction: intravenous. Anesthetic plan and risks discussed with patient.                         Farhat Cedillo MD   10/14/2022

## 2022-10-14 NOTE — ANESTHESIA POSTPROCEDURE EVALUATION
Department of Anesthesiology  Postprocedure Note    Patient: Jerald Nicholson  MRN: [id number]  YOB: 1970  Date of evaluation: 10/14/2022      Procedure Summary     Date: 10/14/22 Room / Location: [facility] ENDO 05 / [facility] ENDOSCOPY    Anesthesia Start: 5321 Anesthesia Stop: 1004    Procedures:       EGD BIOPSY (Upper GI Region)      COLONOSCOPY POLYPECTOMY SNARE Diagnosis:       Hepatitis C carrier ([facility]      RUQ abdominal pain      Screen for colon cancer      (Hepatitis C carrier ([facility] [B18.2])      (RUQ abdominal pain [R10.11])      (Screen for colon cancer [Z12.11])    Surgeons: Roman Kirkland MD Responsible Provider: Ladan Hannah MD    Anesthesia Type: TIVA ASA Status: 3          Anesthesia Type: No value filed.     Myra Phase I: Myra Score: 10    Myra Phase II: Myra Score: 10      Anesthesia Post Evaluation    Patient location during evaluation: PACU  Patient participation: complete - patient participated  Level of consciousness: awake and alert  Airway patency: patent  Nausea & Vomiting: no nausea and no vomiting  Complications: no  Cardiovascular status: hemodynamically stable  Respiratory status: acceptable, nonlabored ventilation and spontaneous ventilation  Hydration status: euvolemic  Comments: BP (!) 147/88   Pulse (!) 47   Temp 96.8 °F (36 °C) (Skin)   Resp 17   Ht 5' 8\" (1.727 m)   Wt 180 lb (81.6 kg)   SpO2 100%   BMI 27.37 kg/m²     Multimodal analgesia pain management approach

## 2022-10-14 NOTE — ADDENDUM NOTE
Addendum  created 10/14/22 1841 by LINDA Oconnor CRNA    Flowsheet accepted, Flowsheet data copied forward, Intraprocedure Flowsheets edited

## 2022-10-14 NOTE — OP NOTE
PROCEDURE: Esophagogastroduodenoscopy    ENDOSCOPIST: Enzo Ball MD    PREOPERATIVE DIAGNOSIS: RUQ pain, Portal HTN/eval for varices    POSTOPERATIVE DIAGNOSIS: Reflux esophagitis, Gastritis     INSTRUMENTS: GIF H190    SEDATION: MAC    DESCRIPTION: After informed consent was obtained, the patient was taken to the endoscopy suite and placed in the left lateral decubitus position. Intravenous sedation was administered by the Anesthesia provider. After adequate sedation had been achieved the endoscope was inserted over the tongue and through the posterior pharynx under direct visualization down the esophagus to the stomach and into the second portion of the duodenum. The endoscopic was withdrawn from that point, performing a careful survey of the mucosa. Retroflexion was performed in the gastric fundus. FINDINGS:  Esophagus: Normal appearing proximal mucosa with LA grade A reflux esophagitis and chronic changes of reflux. Bx's. No varices. Stomach: Normal proximal mucosa with mild distal gastritis. Bx's. Duodenum: Normal mucosa.      Estimated blood loss:  0 cc-minimal    IMPRESSION:   Esophagitis  Gastritis    PLAN:  - F/u path  - Start Omeprazole   - Proceed to colonoscopy    P Jacinda Tomlin MD

## 2022-10-14 NOTE — OP NOTE
PROCEDURE: Colonoscopy    ENDOSCOPIST: Lili Small M.D. PREOPERATIVE DIAGNOSIS: CRCS-1st    POSTOPERATIVE DIAGNOSIS: Polyps, Diverticulosis, Internal hemorrhoids    SEDATION: MAC    INSTRUMENT: CF h190    DESCRIPTION OF PROCEDURE:  After informed consent was obtained the patient was placed in the left lateral decubitus position. Intravenous sedation was administered as above. After adequate sedation had been achieved, digital rectal examination was performed. The colonoscope was then inserted through the anus and followed the course of the rectum and colon to the cecum, which was confirmed by visualization of the ileocecal valve and appendiceal orifice. The colonoscope was withdrawn from that point, performing a careful survey of the mucosa. Retroflexion was performed in the rectum. FINDINGS:  Normal colonic mucosa from rectum to cecum without inflammation. Pan-diverticulosis, severe in left. 6mm, 7mm rectal polyps removed with cold snare. Internal hemorrhoids noted on retroflexion.      Estimated Blood Loss:  0 cc-min    IMPRESSION:  Polyps  Diverticulosis  Internal hemorrhoids     PLAN:  - F/u path  - Repeat colonoscopy pending path: 3/5yrs    TORRES Davis M.D.

## 2023-02-22 RX ORDER — AMLODIPINE BESYLATE 2.5 MG/1
2.5 TABLET ORAL DAILY
Qty: 30 TABLET | Refills: 1 | Status: SHIPPED | OUTPATIENT
Start: 2023-02-22

## 2023-05-02 RX ORDER — AMLODIPINE BESYLATE 2.5 MG/1
2.5 TABLET ORAL DAILY
Qty: 30 TABLET | Refills: 0 | Status: SHIPPED | OUTPATIENT
Start: 2023-05-02 | End: 2023-06-27 | Stop reason: SDUPTHER

## 2023-06-27 ENCOUNTER — OFFICE VISIT (OUTPATIENT)
Dept: INTERNAL MEDICINE CLINIC | Facility: CLINIC | Age: 53
End: 2023-06-27
Payer: MEDICAID

## 2023-06-27 VITALS
DIASTOLIC BLOOD PRESSURE: 70 MMHG | HEART RATE: 88 BPM | BODY MASS INDEX: 25.46 KG/M2 | RESPIRATION RATE: 17 BRPM | HEIGHT: 68 IN | OXYGEN SATURATION: 98 % | WEIGHT: 168 LBS | SYSTOLIC BLOOD PRESSURE: 120 MMHG

## 2023-06-27 DIAGNOSIS — Z78.9 ALCOHOL USE: ICD-10-CM

## 2023-06-27 DIAGNOSIS — Z85.51 HISTORY OF BLADDER CANCER: ICD-10-CM

## 2023-06-27 DIAGNOSIS — I10 PRIMARY HYPERTENSION: Primary | ICD-10-CM

## 2023-06-27 DIAGNOSIS — Z12.5 PROSTATE CANCER SCREENING: ICD-10-CM

## 2023-06-27 DIAGNOSIS — F41.9 ANXIETY AND DEPRESSION: ICD-10-CM

## 2023-06-27 DIAGNOSIS — R76.8 HEPATITIS C ANTIBODY TEST POSITIVE: ICD-10-CM

## 2023-06-27 DIAGNOSIS — F32.A ANXIETY AND DEPRESSION: ICD-10-CM

## 2023-06-27 DIAGNOSIS — Z72.0 TOBACCO ABUSE: ICD-10-CM

## 2023-06-27 DIAGNOSIS — E78.2 MIXED HYPERLIPIDEMIA: ICD-10-CM

## 2023-06-27 LAB
ALBUMIN SERPL-MCNC: 3.3 G/DL (ref 3.5–5)
ALBUMIN/GLOB SERPL: 0.8 (ref 0.4–1.6)
ALP SERPL-CCNC: 158 U/L (ref 50–136)
ALT SERPL-CCNC: 67 U/L (ref 12–65)
ANION GAP SERPL CALC-SCNC: 7 MMOL/L (ref 2–11)
AST SERPL-CCNC: 82 U/L (ref 15–37)
BILIRUB SERPL-MCNC: 1.7 MG/DL (ref 0.2–1.1)
BUN SERPL-MCNC: 7 MG/DL (ref 6–23)
CALCIUM SERPL-MCNC: 8.9 MG/DL (ref 8.3–10.4)
CHLORIDE SERPL-SCNC: 105 MMOL/L (ref 101–110)
CHOLEST SERPL-MCNC: 98 MG/DL
CO2 SERPL-SCNC: 23 MMOL/L (ref 21–32)
CREAT SERPL-MCNC: 1 MG/DL (ref 0.8–1.5)
ERYTHROCYTE [DISTWIDTH] IN BLOOD BY AUTOMATED COUNT: 17.1 % (ref 11.9–14.6)
GLOBULIN SER CALC-MCNC: 4.1 G/DL (ref 2.8–4.5)
GLUCOSE SERPL-MCNC: 114 MG/DL (ref 65–100)
HCT VFR BLD AUTO: 34.3 % (ref 41.1–50.3)
HDLC SERPL-MCNC: 19 MG/DL (ref 40–60)
HDLC SERPL: 5.2
HGB BLD-MCNC: 11.9 G/DL (ref 13.6–17.2)
LDLC SERPL CALC-MCNC: 53.6 MG/DL
MCH RBC QN AUTO: 37.9 PG (ref 26.1–32.9)
MCHC RBC AUTO-ENTMCNC: 34.7 G/DL (ref 31.4–35)
MCV RBC AUTO: 109.2 FL (ref 82–102)
NRBC # BLD: 0 K/UL (ref 0–0.2)
PLATELET # BLD AUTO: 162 K/UL (ref 150–450)
PMV BLD AUTO: 11.6 FL (ref 9.4–12.3)
POTASSIUM SERPL-SCNC: 3 MMOL/L (ref 3.5–5.1)
PROT SERPL-MCNC: 7.4 G/DL (ref 6.3–8.2)
PSA SERPL-MCNC: 0.6 NG/ML
RBC # BLD AUTO: 3.14 M/UL (ref 4.23–5.6)
SODIUM SERPL-SCNC: 135 MMOL/L (ref 133–143)
TRIGL SERPL-MCNC: 127 MG/DL (ref 35–150)
TSH, 3RD GENERATION: 2.49 UIU/ML (ref 0.36–3.74)
VLDLC SERPL CALC-MCNC: 25.4 MG/DL (ref 6–23)
WBC # BLD AUTO: 11 K/UL (ref 4.3–11.1)

## 2023-06-27 PROCEDURE — 3074F SYST BP LT 130 MM HG: CPT | Performed by: NURSE PRACTITIONER

## 2023-06-27 PROCEDURE — 99214 OFFICE O/P EST MOD 30 MIN: CPT | Performed by: NURSE PRACTITIONER

## 2023-06-27 PROCEDURE — 3078F DIAST BP <80 MM HG: CPT | Performed by: NURSE PRACTITIONER

## 2023-06-27 RX ORDER — OMEPRAZOLE 40 MG/1
40 CAPSULE, DELAYED RELEASE ORAL DAILY
COMMUNITY

## 2023-06-27 RX ORDER — AMLODIPINE BESYLATE 2.5 MG/1
2.5 TABLET ORAL DAILY
Qty: 30 TABLET | Refills: 5 | Status: SHIPPED | OUTPATIENT
Start: 2023-06-27

## 2023-06-27 RX ORDER — FLUOXETINE 10 MG/1
10 CAPSULE ORAL DAILY
Qty: 30 CAPSULE | Refills: 5 | Status: SHIPPED | OUTPATIENT
Start: 2023-06-27

## 2023-06-27 SDOH — ECONOMIC STABILITY: FOOD INSECURITY: WITHIN THE PAST 12 MONTHS, YOU WORRIED THAT YOUR FOOD WOULD RUN OUT BEFORE YOU GOT MONEY TO BUY MORE.: NEVER TRUE

## 2023-06-27 SDOH — ECONOMIC STABILITY: FOOD INSECURITY: WITHIN THE PAST 12 MONTHS, THE FOOD YOU BOUGHT JUST DIDN'T LAST AND YOU DIDN'T HAVE MONEY TO GET MORE.: NEVER TRUE

## 2023-06-27 SDOH — ECONOMIC STABILITY: INCOME INSECURITY: HOW HARD IS IT FOR YOU TO PAY FOR THE VERY BASICS LIKE FOOD, HOUSING, MEDICAL CARE, AND HEATING?: NOT HARD AT ALL

## 2023-06-27 SDOH — ECONOMIC STABILITY: HOUSING INSECURITY
IN THE LAST 12 MONTHS, WAS THERE A TIME WHEN YOU DID NOT HAVE A STEADY PLACE TO SLEEP OR SLEPT IN A SHELTER (INCLUDING NOW)?: NO

## 2023-06-27 ASSESSMENT — ANXIETY QUESTIONNAIRES
5. BEING SO RESTLESS THAT IT IS HARD TO SIT STILL: 2
2. NOT BEING ABLE TO STOP OR CONTROL WORRYING: 3
7. FEELING AFRAID AS IF SOMETHING AWFUL MIGHT HAPPEN: 1
GAD7 TOTAL SCORE: 17
6. BECOMING EASILY ANNOYED OR IRRITABLE: 3
4. TROUBLE RELAXING: 2
1. FEELING NERVOUS, ANXIOUS, OR ON EDGE: 3
3. WORRYING TOO MUCH ABOUT DIFFERENT THINGS: 3
IF YOU CHECKED OFF ANY PROBLEMS ON THIS QUESTIONNAIRE, HOW DIFFICULT HAVE THESE PROBLEMS MADE IT FOR YOU TO DO YOUR WORK, TAKE CARE OF THINGS AT HOME, OR GET ALONG WITH OTHER PEOPLE: VERY DIFFICULT

## 2023-06-27 ASSESSMENT — PATIENT HEALTH QUESTIONNAIRE - PHQ9
SUM OF ALL RESPONSES TO PHQ QUESTIONS 1-9: 0
2. FEELING DOWN, DEPRESSED OR HOPELESS: 0
SUM OF ALL RESPONSES TO PHQ QUESTIONS 1-9: 0
SUM OF ALL RESPONSES TO PHQ9 QUESTIONS 1 & 2: 0
1. LITTLE INTEREST OR PLEASURE IN DOING THINGS: 0

## 2023-06-28 DIAGNOSIS — D64.9 ANEMIA, UNSPECIFIED TYPE: Primary | ICD-10-CM

## 2023-06-28 DIAGNOSIS — E87.6 LOW BLOOD POTASSIUM: Primary | ICD-10-CM

## 2023-06-28 DIAGNOSIS — D64.9 ANEMIA, UNSPECIFIED TYPE: ICD-10-CM

## 2023-06-28 LAB
EST. AVERAGE GLUCOSE BLD GHB EST-MCNC: 88 MG/DL
FERRITIN SERPL-MCNC: 848 NG/ML (ref 8–388)
HBA1C MFR BLD: 4.7 % (ref 4.8–5.6)
HGB RETIC QN AUTO: 40 PG (ref 29–35)
IMM RETICS NFR: 16.5 % (ref 2.3–13.4)
IRON SERPL-MCNC: 146 UG/DL (ref 35–150)
RETICS # AUTO: 0.1 M/UL (ref 0.03–0.1)
RETICS/RBC NFR AUTO: 3.2 % (ref 0.3–2)
TIBC SERPL-MCNC: 279 UG/DL (ref 250–450)
VIT B12 SERPL-MCNC: 1761 PG/ML (ref 193–986)

## 2023-06-28 RX ORDER — POTASSIUM CHLORIDE 750 MG/1
10 TABLET, EXTENDED RELEASE ORAL DAILY
Qty: 30 TABLET | Refills: 5 | Status: SHIPPED | OUTPATIENT
Start: 2023-06-28

## 2023-06-30 LAB
Lab: NORMAL
Lab: NORMAL
REFERENCE LAB: NORMAL

## 2023-08-24 DIAGNOSIS — C67.9 MALIGNANT NEOPLASM OF URINARY BLADDER, UNSPECIFIED SITE (HCC): ICD-10-CM

## 2023-08-24 DIAGNOSIS — Z85.51 HISTORY OF BLADDER CANCER: Primary | ICD-10-CM

## 2023-09-13 NOTE — H&P
History and Physical      Patient: Som Nail    Physician: Eboni Castro MD    Referring Physician: LINDA Parsons CNP    Chief Complaint: For colonoscopy and EGD    History of Present Illness: Pt presents for RUQ pain, portal HTN, and CRCS. History:  Past Medical History:   Diagnosis Date    Hepatitis C     History of bladder cancer     HTN (hypertension)      Past Surgical History:   Procedure Laterality Date    BLADDER STONE REMOVAL      OTHER SURGICAL HISTORY      bladder cancer    US GUIDED LIVER BIOPSY PERCUTANEOUS  9/12/2022    US GUIDED LIVER BIOPSY PERCUTANEOUS 9/12/2022 [facility]      Social History     Socioeconomic History    Marital status: Single     Spouse name: None    Number of children: None    Years of education: None    Highest education level: None   Tobacco Use    Smoking status: Every Day     Packs/day: 1.00     Years: 40.00     Pack years: 40.00     Types: Cigarettes    Smokeless tobacco: Never   Vaping Use    Vaping Use: Never used   Substance and Sexual Activity    Alcohol use: Yes     Alcohol/week: 24.0 standard drinks     Types: 24 Cans of beer per week     Comment: occasional    Drug use: Not Currently    Sexual activity: Yes     Partners: Female      Family History   Problem Relation Age of Onset    No Known Problems Mother     Esophageal Cancer Father     No Known Problems Sister     Diabetes Paternal Uncle     Heart Disease Neg Hx     Stroke Neg Hx        Medications:   Prior to Admission medications    Medication Sig Start Date End Date Taking? Authorizing Provider   amLODIPine (NORVASC) 2.5 MG tablet Take 1 tablet by mouth daily 6/30/22   LINDA Parsons CNP       Allergies: No Known Allergies    Physical Exam:     Vital Signs: /77   Pulse 51   Temp 97.8 °F (36.6 °C) (Oral)   Resp 15   Ht 5' 8\" (1.727 m)   Wt 180 lb (81.6 kg)   SpO2 97%   BMI 27.37 kg/m²   .     General: no distress      Heart: regular   Lungs: unlabored Abdominal: soft   Neurological: Grossly normal        Findings/Diagnosis: RUQ pain, portal HTN, CRCS    Plan of Care/Planned Procedure: Colonoscopy, possible polypectomy and EGD. Pt/designee has reviewed the colonoscopy information sheet. Any questions have been discussed. They agree to proceed.       Signed:  Fiorella De La Fuente MD   10/14/2022 Complex Repair And Tissue Cultured Epidermal Autograft Text: The defect edges were debeveled with a #15 scalpel blade.  The primary defect was closed partially with a complex linear closure.  Given the location of the defect, shape of the defect and the proximity to free margins an tissue cultured epidermal autograft was deemed most appropriate to repair the remaining defect.  The graft was trimmed to fit the size of the remaining defect.  The graft was then placed in the primary defect, oriented appropriately, and sutured into place.

## 2024-01-11 RX ORDER — FLUOXETINE 10 MG/1
10 CAPSULE ORAL DAILY
Qty: 30 CAPSULE | Refills: 5 | Status: SHIPPED | OUTPATIENT
Start: 2024-01-11

## 2024-01-11 RX ORDER — AMLODIPINE BESYLATE 2.5 MG/1
2.5 TABLET ORAL DAILY
Qty: 30 TABLET | Refills: 5 | Status: SHIPPED | OUTPATIENT
Start: 2024-01-11

## 2024-01-11 NOTE — TELEPHONE ENCOUNTER
----- Message from Edel Arias sent at 1/11/2024  3:46 PM EST -----  Subject: Refill Request    QUESTIONS  Name of Medication? FLUoxetine (PROZAC) 10 MG capsule  Patient-reported dosage and instructions? once daily  How many days do you have left? 0  Preferred Pharmacy? [facility]  Pharmacy phone number (if available)? [phone]  ---------------------------------------------------------------------------  --------------,  Name of Medication? amLODIPine (NORVASC) 2.5 MG tablet  Patient-reported dosage and instructions? once daily  How many days do you have left? 0  Preferred Pharmacy? [facility]  Pharmacy phone number (if available)? [phone]  Additional Information for Provider? Has appt on 01/18 for these refills   if needed but is out of these now.  ---------------------------------------------------------------------------  --------------  CALL BACK INFO  What is the best way for the office to contact you? OK to leave message on   voicemail  Preferred Call Back Phone Number? [phone]  ---------------------------------------------------------------------------  --------------  SCRIPT ANSWERS  Relationship to Patient? Self

## 2024-01-18 ENCOUNTER — OFFICE VISIT (OUTPATIENT)
Dept: INTERNAL MEDICINE CLINIC | Facility: CLINIC | Age: 54
End: 2024-01-18
Payer: MEDICAID

## 2024-01-18 VITALS
WEIGHT: 188 LBS | HEIGHT: 68 IN | BODY MASS INDEX: 28.49 KG/M2 | OXYGEN SATURATION: 100 % | SYSTOLIC BLOOD PRESSURE: 120 MMHG | HEART RATE: 76 BPM | TEMPERATURE: 98.7 F | DIASTOLIC BLOOD PRESSURE: 80 MMHG

## 2024-01-18 DIAGNOSIS — I10 PRIMARY HYPERTENSION: ICD-10-CM

## 2024-01-18 DIAGNOSIS — Z72.0 TOBACCO ABUSE: ICD-10-CM

## 2024-01-18 DIAGNOSIS — Z85.51 HISTORY OF BLADDER CANCER: ICD-10-CM

## 2024-01-18 DIAGNOSIS — K76.89 LIVER NODULE: ICD-10-CM

## 2024-01-18 DIAGNOSIS — D64.9 ANEMIA, UNSPECIFIED TYPE: ICD-10-CM

## 2024-01-18 DIAGNOSIS — Z12.5 PROSTATE CANCER SCREENING: ICD-10-CM

## 2024-01-18 DIAGNOSIS — B19.20 HEPATITIS C VIRUS INFECTION WITHOUT HEPATIC COMA, UNSPECIFIED CHRONICITY: ICD-10-CM

## 2024-01-18 DIAGNOSIS — K74.60 HEPATIC CIRRHOSIS, UNSPECIFIED HEPATIC CIRRHOSIS TYPE, UNSPECIFIED WHETHER ASCITES PRESENT (HCC): ICD-10-CM

## 2024-01-18 DIAGNOSIS — E78.2 MIXED HYPERLIPIDEMIA: Primary | ICD-10-CM

## 2024-01-18 PROCEDURE — 3079F DIAST BP 80-89 MM HG: CPT | Performed by: NURSE PRACTITIONER

## 2024-01-18 PROCEDURE — 3074F SYST BP LT 130 MM HG: CPT | Performed by: NURSE PRACTITIONER

## 2024-01-18 PROCEDURE — 99214 OFFICE O/P EST MOD 30 MIN: CPT | Performed by: NURSE PRACTITIONER

## 2024-01-18 RX ORDER — FUROSEMIDE 40 MG/1
40 TABLET ORAL 2 TIMES DAILY
Qty: 60 TABLET | Refills: 11 | Status: SHIPPED | OUTPATIENT
Start: 2024-01-18 | End: 2025-01-17

## 2024-01-18 RX ORDER — OMEPRAZOLE 20 MG/1
20 CAPSULE, DELAYED RELEASE ORAL EVERY MORNING
COMMUNITY
Start: 2023-12-28

## 2024-01-18 RX ORDER — SPIRONOLACTONE 100 MG/1
100 TABLET, FILM COATED ORAL DAILY
Qty: 30 TABLET | Refills: 11 | Status: SHIPPED | OUTPATIENT
Start: 2024-01-18 | End: 2025-01-17

## 2024-01-18 RX ORDER — SPIRONOLACTONE 100 MG/1
100 TABLET, FILM COATED ORAL DAILY
COMMUNITY
Start: 2023-09-03 | End: 2024-01-18 | Stop reason: SDUPTHER

## 2024-01-18 RX ORDER — POTASSIUM CHLORIDE 750 MG/1
10 TABLET, EXTENDED RELEASE ORAL DAILY
Qty: 30 TABLET | Refills: 5 | Status: SHIPPED | OUTPATIENT
Start: 2024-01-18

## 2024-01-18 RX ORDER — FUROSEMIDE 40 MG/1
40 TABLET ORAL 2 TIMES DAILY
COMMUNITY
Start: 2023-09-02 | End: 2024-01-18 | Stop reason: SDUPTHER

## 2024-01-18 RX ORDER — VELPATASVIR AND SOFOSBUVIR 100; 400 MG/1; MG/1
1 TABLET, FILM COATED ORAL EVERY MORNING
COMMUNITY

## 2024-01-18 ASSESSMENT — PATIENT HEALTH QUESTIONNAIRE - PHQ9
SUM OF ALL RESPONSES TO PHQ QUESTIONS 1-9: 0
1. LITTLE INTEREST OR PLEASURE IN DOING THINGS: 0
SUM OF ALL RESPONSES TO PHQ QUESTIONS 1-9: 0
2. FEELING DOWN, DEPRESSED OR HOPELESS: 0
SUM OF ALL RESPONSES TO PHQ9 QUESTIONS 1 & 2: 0

## 2024-01-18 NOTE — PROGRESS NOTES
Gibran Paniagua (:  1970) is a 53 y.o. male,Established patient, here for evaluation of the following chief complaint(s):  Hypertension and Medication Refill         ASSESSMENT/PLAN:  1. Mixed hyperlipidemia  -     Microalbumin / Creatinine Urine Ratio; Future  -     Hemoglobin A1C; Future  -     Lipid Panel; Future  -     TSH; Future  -     Comprehensive Metabolic Panel; Future  -     CBC; Future  -     PSA Screening; Future  -     Magnesium; Future  -     Iron; Future  -     Ferritin; Future  -     Total Iron Binding Capacity; Future  -     Reticulocytes; Future  -     Vitamin B12; Future  -     Folate; Future  2. Primary hypertension  -     Microalbumin / Creatinine Urine Ratio; Future  -     Hemoglobin A1C; Future  -     Lipid Panel; Future  -     TSH; Future  -     Comprehensive Metabolic Panel; Future  -     CBC; Future  -     PSA Screening; Future  -     Magnesium; Future  -     Iron; Future  -     Ferritin; Future  -     Total Iron Binding Capacity; Future  -     Reticulocytes; Future  -     Vitamin B12; Future  -     Folate; Future  3. History of bladder cancer  -     Microalbumin / Creatinine Urine Ratio; Future  -     Hemoglobin A1C; Future  -     Lipid Panel; Future  -     TSH; Future  -     Comprehensive Metabolic Panel; Future  -     CBC; Future  -     PSA Screening; Future  -     Magnesium; Future  -     Iron; Future  -     Ferritin; Future  -     Total Iron Binding Capacity; Future  -     Reticulocytes; Future  -     Vitamin B12; Future  -     Folate; Future  -     [facility]  4. Prostate cancer screening  -     Microalbumin / Creatinine Urine Ratio; Future  -     Hemoglobin A1C; Future  -     Lipid Panel; Future  -     TSH; Future  -     Comprehensive Metabolic Panel; Future  -     CBC; Future  -     PSA Screening; Future  -     Magnesium; Future  -     Iron; Future  -     Ferritin; Future  -     Total Iron Binding Capacity; Future  -     Reticulocytes;

## 2024-01-19 LAB
ALBUMIN SERPL-MCNC: 3.3 G/DL (ref 3.5–5)
ALBUMIN/GLOB SERPL: 0.8 (ref 0.4–1.6)
ALP SERPL-CCNC: 206 U/L (ref 50–136)
ALT SERPL-CCNC: 29 U/L (ref 12–65)
ANION GAP SERPL CALC-SCNC: 4 MMOL/L (ref 2–11)
AST SERPL-CCNC: 40 U/L (ref 15–37)
BILIRUB SERPL-MCNC: 1.7 MG/DL (ref 0.2–1.1)
BUN SERPL-MCNC: 18 MG/DL (ref 6–23)
CALCIUM SERPL-MCNC: 9.3 MG/DL (ref 8.3–10.4)
CHLORIDE SERPL-SCNC: 105 MMOL/L (ref 103–113)
CHOLEST SERPL-MCNC: 205 MG/DL
CO2 SERPL-SCNC: 27 MMOL/L (ref 21–32)
CREAT SERPL-MCNC: 1.4 MG/DL (ref 0.8–1.5)
CREAT UR-MCNC: 34 MG/DL
ERYTHROCYTE [DISTWIDTH] IN BLOOD BY AUTOMATED COUNT: 15.7 % (ref 11.9–14.6)
EST. AVERAGE GLUCOSE BLD GHB EST-MCNC: 100 MG/DL
FERRITIN SERPL-MCNC: 452 NG/ML (ref 8–388)
FOLATE SERPL-MCNC: 7.8 NG/ML (ref 3.1–17.5)
GLOBULIN SER CALC-MCNC: 4.3 G/DL (ref 2.8–4.5)
GLUCOSE SERPL-MCNC: 106 MG/DL (ref 65–100)
HBA1C MFR BLD: 5.1 % (ref 4.8–5.6)
HCT VFR BLD AUTO: 36.4 % (ref 41.1–50.3)
HDLC SERPL-MCNC: 31 MG/DL (ref 40–60)
HDLC SERPL: 6.6
HGB BLD-MCNC: 12.6 G/DL (ref 13.6–17.2)
HGB RETIC QN AUTO: 40 PG (ref 29–35)
IMM RETICS NFR: 9.1 % (ref 2.3–13.4)
IRON SERPL-MCNC: 133 UG/DL (ref 35–150)
LDLC SERPL CALC-MCNC: 147 MG/DL
MAGNESIUM SERPL-MCNC: 1.8 MG/DL (ref 1.8–2.4)
MCH RBC QN AUTO: 35.7 PG (ref 26.1–32.9)
MCHC RBC AUTO-ENTMCNC: 34.6 G/DL (ref 31.4–35)
MCV RBC AUTO: 103.1 FL (ref 82–102)
MICROALBUMIN UR-MCNC: <0.5 MG/DL
MICROALBUMIN/CREAT UR-RTO: NORMAL MG/G (ref 0–30)
NRBC # BLD: 0 K/UL (ref 0–0.2)
PLATELET # BLD AUTO: 157 K/UL (ref 150–450)
PMV BLD AUTO: 11.5 FL (ref 9.4–12.3)
POTASSIUM SERPL-SCNC: 4.2 MMOL/L (ref 3.5–5.1)
PROT SERPL-MCNC: 7.6 G/DL (ref 6.3–8.2)
PSA SERPL-MCNC: 1.5 NG/ML
RBC # BLD AUTO: 3.53 M/UL (ref 4.23–5.6)
RETICS # AUTO: 0.07 M/UL (ref 0.03–0.1)
RETICS/RBC NFR AUTO: 2 % (ref 0.3–2)
SODIUM SERPL-SCNC: 136 MMOL/L (ref 136–146)
TIBC SERPL-MCNC: 282 UG/DL (ref 250–450)
TRIGL SERPL-MCNC: 135 MG/DL (ref 35–150)
TSH, 3RD GENERATION: 3.42 UIU/ML (ref 0.36–3.74)
VIT B12 SERPL-MCNC: 1853 PG/ML (ref 193–986)
VLDLC SERPL CALC-MCNC: 27 MG/DL (ref 6–23)
WBC # BLD AUTO: 11.1 K/UL (ref 4.3–11.1)

## 2024-02-15 ENCOUNTER — OFFICE VISIT (OUTPATIENT)
Dept: INTERNAL MEDICINE CLINIC | Facility: CLINIC | Age: 54
End: 2024-02-15
Payer: MEDICAID

## 2024-02-15 VITALS
DIASTOLIC BLOOD PRESSURE: 78 MMHG | BODY MASS INDEX: 28.04 KG/M2 | SYSTOLIC BLOOD PRESSURE: 128 MMHG | HEIGHT: 68 IN | HEART RATE: 68 BPM | TEMPERATURE: 98.2 F | OXYGEN SATURATION: 100 % | WEIGHT: 185 LBS

## 2024-02-15 DIAGNOSIS — K70.30 ALCOHOLIC CIRRHOSIS, UNSPECIFIED WHETHER ASCITES PRESENT (HCC): ICD-10-CM

## 2024-02-15 DIAGNOSIS — K76.89 LIVER CYST: ICD-10-CM

## 2024-02-15 DIAGNOSIS — E78.2 MIXED HYPERLIPIDEMIA: ICD-10-CM

## 2024-02-15 DIAGNOSIS — I85.00 ESOPHAGEAL VARICES WITHOUT BLEEDING, UNSPECIFIED ESOPHAGEAL VARICES TYPE (HCC): ICD-10-CM

## 2024-02-15 DIAGNOSIS — N17.9 ACUTE KIDNEY INJURY (HCC): ICD-10-CM

## 2024-02-15 DIAGNOSIS — D64.9 ANEMIA, UNSPECIFIED TYPE: ICD-10-CM

## 2024-02-15 DIAGNOSIS — I10 PRIMARY HYPERTENSION: Primary | ICD-10-CM

## 2024-02-15 PROCEDURE — 99214 OFFICE O/P EST MOD 30 MIN: CPT | Performed by: NURSE PRACTITIONER

## 2024-02-15 PROCEDURE — 3074F SYST BP LT 130 MM HG: CPT | Performed by: NURSE PRACTITIONER

## 2024-02-15 PROCEDURE — 3078F DIAST BP <80 MM HG: CPT | Performed by: NURSE PRACTITIONER

## 2024-02-15 RX ORDER — LOPERAMIDE HYDROCHLORIDE 2 MG/1
2 CAPSULE ORAL 3 TIMES DAILY PRN
COMMUNITY
Start: 2024-02-07 | End: 2024-02-17

## 2024-02-15 RX ORDER — VANCOMYCIN HYDROCHLORIDE 50 MG/ML
KIT ORAL
COMMUNITY
Start: 2024-02-09

## 2024-02-15 NOTE — PROGRESS NOTES
Gibran Paniagua (:  1970) is a 53 y.o. male,Established patient, here for evaluation of the following chief complaint(s):  Follow-Up from Hospital         ASSESSMENT/PLAN:  1. Primary hypertension  2. Anemia, unspecified type  -     CBC with Auto Differential; Future  -     Iron; Future  -     Ferritin; Future  -     Total Iron Binding Capacity; Future  -     Reticulocytes; Future  -     Vitamin B12; Future  -     Folate; Future  -     Comprehensive Metabolic Panel; Future  3. Acute kidney injury (HCC)  -     CBC with Auto Differential; Future  -     Iron; Future  -     Ferritin; Future  -     Total Iron Binding Capacity; Future  -     Reticulocytes; Future  -     Vitamin B12; Future  -     Folate; Future  -     Comprehensive Metabolic Panel; Future  4. Esophageal varices without bleeding, unspecified esophageal varices type (HCC)  5. Alcoholic cirrhosis, unspecified whether ascites present (HCC)  6. Mixed hyperlipidemia  7. Liver cyst      Return in about 4 weeks (around 3/14/2024).       Reviewed lab and imaging from hospital  Finish vancomycin for c diff  Discussed liver US showing more benign appearance to liver lesion  Recheck anemia and renal function next week on omeprazole, continue, keep gastro appt for f/u varices  Discussed lipids, work on Mediterranean diet, was previously well controlled, work on diet to control    Subjective   SUBJECTIVE/OBJECTIVE:  Patient is here for hospital follow up. He was admitted for a few days for nausea, vomiting, diarrhea, norovirus with c diff, varices, etc. He had some decreased hgb. He was sent home with referral to gastro set up. He is still taking vancomycin for c diff and omeprazole     He now feels he has been better but he has been worse. Better than he was.     His  is wanting him to go to job fairs. He would like a note to excuse him from trying to find a full-time job at this time.         Review of Systems       Objective   Physical

## 2024-03-14 ENCOUNTER — OFFICE VISIT (OUTPATIENT)
Dept: INTERNAL MEDICINE CLINIC | Facility: CLINIC | Age: 54
End: 2024-03-14
Payer: MEDICAID

## 2024-03-14 VITALS
TEMPERATURE: 98.3 F | BODY MASS INDEX: 28.34 KG/M2 | OXYGEN SATURATION: 98 % | WEIGHT: 187 LBS | DIASTOLIC BLOOD PRESSURE: 70 MMHG | HEART RATE: 81 BPM | HEIGHT: 68 IN | SYSTOLIC BLOOD PRESSURE: 128 MMHG

## 2024-03-14 DIAGNOSIS — N62 SUBAREOLAR GYNECOMASTIA IN MALE: Primary | ICD-10-CM

## 2024-03-14 DIAGNOSIS — E78.2 MIXED HYPERLIPIDEMIA: ICD-10-CM

## 2024-03-14 DIAGNOSIS — I10 PRIMARY HYPERTENSION: ICD-10-CM

## 2024-03-14 DIAGNOSIS — B19.20 HEPATITIS C VIRUS INFECTION WITHOUT HEPATIC COMA, UNSPECIFIED CHRONICITY: ICD-10-CM

## 2024-03-14 DIAGNOSIS — K70.30 ALCOHOLIC CIRRHOSIS, UNSPECIFIED WHETHER ASCITES PRESENT (HCC): ICD-10-CM

## 2024-03-14 PROCEDURE — 3078F DIAST BP <80 MM HG: CPT | Performed by: NURSE PRACTITIONER

## 2024-03-14 PROCEDURE — 99214 OFFICE O/P EST MOD 30 MIN: CPT | Performed by: NURSE PRACTITIONER

## 2024-03-14 PROCEDURE — 3074F SYST BP LT 130 MM HG: CPT | Performed by: NURSE PRACTITIONER

## 2024-03-14 RX ORDER — VARENICLINE TARTRATE 0.5 MG/1
.5-1 TABLET, FILM COATED ORAL SEE ADMIN INSTRUCTIONS
Qty: 57 TABLET | Refills: 0 | Status: SHIPPED | OUTPATIENT
Start: 2024-03-14

## 2024-03-14 RX ORDER — VARENICLINE TARTRATE 1 MG/1
1 TABLET, FILM COATED ORAL 2 TIMES DAILY
Qty: 60 TABLET | Refills: 4 | Status: SHIPPED | OUTPATIENT
Start: 2024-03-14

## 2024-03-14 NOTE — PROGRESS NOTES
Left: Tenderness present. No inverted nipple or mass.   Musculoskeletal:      Cervical back: Neck supple.   Neurological:      Mental Status: He is alert.                  An electronic signature was used to authenticate this note.    --LINDA Chung - CNP

## 2024-09-24 RX ORDER — FUROSEMIDE 40 MG
40 TABLET ORAL 2 TIMES DAILY
Qty: 60 TABLET | Refills: 1 | Status: SHIPPED | OUTPATIENT
Start: 2024-09-24 | End: 2025-09-24

## 2025-01-30 ENCOUNTER — OFFICE VISIT (OUTPATIENT)
Dept: INTERNAL MEDICINE CLINIC | Facility: CLINIC | Age: 55
End: 2025-01-30
Payer: COMMERCIAL

## 2025-01-30 VITALS
HEART RATE: 77 BPM | DIASTOLIC BLOOD PRESSURE: 74 MMHG | RESPIRATION RATE: 16 BRPM | SYSTOLIC BLOOD PRESSURE: 132 MMHG | HEIGHT: 68 IN | BODY MASS INDEX: 26.07 KG/M2 | WEIGHT: 172 LBS | TEMPERATURE: 98.9 F | OXYGEN SATURATION: 99 %

## 2025-01-30 DIAGNOSIS — K70.30 ALCOHOLIC CIRRHOSIS, UNSPECIFIED WHETHER ASCITES PRESENT (HCC): ICD-10-CM

## 2025-01-30 DIAGNOSIS — K72.90 DECOMPENSATED CIRRHOSIS (HCC): ICD-10-CM

## 2025-01-30 DIAGNOSIS — Z72.0 TOBACCO ABUSE: ICD-10-CM

## 2025-01-30 DIAGNOSIS — K74.60 DECOMPENSATED CIRRHOSIS (HCC): Primary | ICD-10-CM

## 2025-01-30 DIAGNOSIS — M79.89 LEG SWELLING: ICD-10-CM

## 2025-01-30 DIAGNOSIS — N17.9 ACUTE KIDNEY INJURY (HCC): ICD-10-CM

## 2025-01-30 DIAGNOSIS — K74.60 DECOMPENSATED CIRRHOSIS (HCC): ICD-10-CM

## 2025-01-30 DIAGNOSIS — K72.90 DECOMPENSATED CIRRHOSIS (HCC): Primary | ICD-10-CM

## 2025-01-30 DIAGNOSIS — D64.9 ANEMIA, UNSPECIFIED TYPE: ICD-10-CM

## 2025-01-30 PROCEDURE — 99214 OFFICE O/P EST MOD 30 MIN: CPT | Performed by: NURSE PRACTITIONER

## 2025-01-30 PROCEDURE — 3075F SYST BP GE 130 - 139MM HG: CPT | Performed by: NURSE PRACTITIONER

## 2025-01-30 PROCEDURE — 3078F DIAST BP <80 MM HG: CPT | Performed by: NURSE PRACTITIONER

## 2025-01-30 RX ORDER — LACTULOSE 10 G/15ML
20 SOLUTION ORAL 3 TIMES DAILY
COMMUNITY

## 2025-01-30 RX ORDER — CARVEDILOL 3.12 MG/1
3.12 TABLET ORAL 2 TIMES DAILY WITH MEALS
COMMUNITY
Start: 2025-01-26 | End: 2025-04-26

## 2025-01-30 RX ORDER — FOLIC ACID 1 MG/1
1 TABLET ORAL DAILY
COMMUNITY
Start: 2025-01-27 | End: 2025-04-27

## 2025-01-30 RX ORDER — PANTOPRAZOLE SODIUM 40 MG/1
40 TABLET, DELAYED RELEASE ORAL DAILY
COMMUNITY

## 2025-01-30 RX ORDER — ERGOCALCIFEROL 1.25 MG/1
50000 CAPSULE, LIQUID FILLED ORAL WEEKLY
COMMUNITY
Start: 2024-12-13 | End: 2025-02-01

## 2025-01-30 RX ORDER — ONDANSETRON 4 MG/1
4 TABLET, ORALLY DISINTEGRATING ORAL EVERY 8 HOURS PRN
COMMUNITY
Start: 2025-01-26

## 2025-01-30 SDOH — ECONOMIC STABILITY: FOOD INSECURITY: WITHIN THE PAST 12 MONTHS, YOU WORRIED THAT YOUR FOOD WOULD RUN OUT BEFORE YOU GOT MONEY TO BUY MORE.: OFTEN TRUE

## 2025-01-30 SDOH — ECONOMIC STABILITY: FOOD INSECURITY: WITHIN THE PAST 12 MONTHS, THE FOOD YOU BOUGHT JUST DIDN'T LAST AND YOU DIDN'T HAVE MONEY TO GET MORE.: OFTEN TRUE

## 2025-01-30 SDOH — ECONOMIC STABILITY: INCOME INSECURITY: IN THE LAST 12 MONTHS, WAS THERE A TIME WHEN YOU WERE NOT ABLE TO PAY THE MORTGAGE OR RENT ON TIME?: YES

## 2025-01-30 SDOH — ECONOMIC STABILITY: TRANSPORTATION INSECURITY
IN THE PAST 12 MONTHS, HAS THE LACK OF TRANSPORTATION KEPT YOU FROM MEDICAL APPOINTMENTS OR FROM GETTING MEDICATIONS?: NO

## 2025-01-30 SDOH — ECONOMIC STABILITY: TRANSPORTATION INSECURITY
IN THE PAST 12 MONTHS, HAS LACK OF TRANSPORTATION KEPT YOU FROM MEETINGS, WORK, OR FROM GETTING THINGS NEEDED FOR DAILY LIVING?: NO

## 2025-01-30 ASSESSMENT — PATIENT HEALTH QUESTIONNAIRE - PHQ9
7. TROUBLE CONCENTRATING ON THINGS, SUCH AS READING THE NEWSPAPER OR WATCHING TELEVISION: NEARLY EVERY DAY
6. FEELING BAD ABOUT YOURSELF - OR THAT YOU ARE A FAILURE OR HAVE LET YOURSELF OR YOUR FAMILY DOWN: NEARLY EVERY DAY
8. MOVING OR SPEAKING SO SLOWLY THAT OTHER PEOPLE COULD HAVE NOTICED. OR THE OPPOSITE - BEING SO FIDGETY OR RESTLESS THAT YOU HAVE BEEN MOVING AROUND A LOT MORE THAN USUAL: NEARLY EVERY DAY
SUM OF ALL RESPONSES TO PHQ QUESTIONS 1-9: 24
10. IF YOU CHECKED OFF ANY PROBLEMS, HOW DIFFICULT HAVE THESE PROBLEMS MADE IT FOR YOU TO DO YOUR WORK, TAKE CARE OF THINGS AT HOME, OR GET ALONG WITH OTHER PEOPLE: EXTREMELY DIFFICULT
9. THOUGHTS THAT YOU WOULD BE BETTER OFF DEAD, OR OF HURTING YOURSELF: NOT AT ALL
6. FEELING BAD ABOUT YOURSELF - OR THAT YOU ARE A FAILURE OR HAVE LET YOURSELF OR YOUR FAMILY DOWN: NEARLY EVERY DAY
3. TROUBLE FALLING OR STAYING ASLEEP: NEARLY EVERY DAY
10. IF YOU CHECKED OFF ANY PROBLEMS, HOW DIFFICULT HAVE THESE PROBLEMS MADE IT FOR YOU TO DO YOUR WORK, TAKE CARE OF THINGS AT HOME, OR GET ALONG WITH OTHER PEOPLE: EXTREMELY DIFFICULT
5. POOR APPETITE OR OVEREATING: NEARLY EVERY DAY
1. LITTLE INTEREST OR PLEASURE IN DOING THINGS: NEARLY EVERY DAY
7. TROUBLE CONCENTRATING ON THINGS, SUCH AS READING THE NEWSPAPER OR WATCHING TELEVISION: NEARLY EVERY DAY
9. THOUGHTS THAT YOU WOULD BE BETTER OFF DEAD, OR OF HURTING YOURSELF: NOT AT ALL
SUM OF ALL RESPONSES TO PHQ QUESTIONS 1-9: 24
2. FEELING DOWN, DEPRESSED OR HOPELESS: NEARLY EVERY DAY
SUM OF ALL RESPONSES TO PHQ QUESTIONS 1-9: 24
4. FEELING TIRED OR HAVING LITTLE ENERGY: NEARLY EVERY DAY
SUM OF ALL RESPONSES TO PHQ9 QUESTIONS 1 & 2: 6
3. TROUBLE FALLING OR STAYING ASLEEP: NEARLY EVERY DAY
SUM OF ALL RESPONSES TO PHQ QUESTIONS 1-9: 24
2. FEELING DOWN, DEPRESSED OR HOPELESS: NEARLY EVERY DAY
4. FEELING TIRED OR HAVING LITTLE ENERGY: NEARLY EVERY DAY
1. LITTLE INTEREST OR PLEASURE IN DOING THINGS: NEARLY EVERY DAY
5. POOR APPETITE OR OVEREATING: NEARLY EVERY DAY
8. MOVING OR SPEAKING SO SLOWLY THAT OTHER PEOPLE COULD HAVE NOTICED. OR THE OPPOSITE, BEING SO FIGETY OR RESTLESS THAT YOU HAVE BEEN MOVING AROUND A LOT MORE THAN USUAL: NEARLY EVERY DAY
SUM OF ALL RESPONSES TO PHQ QUESTIONS 1-9: 24

## 2025-01-30 ASSESSMENT — COLUMBIA-SUICIDE SEVERITY RATING SCALE - C-SSRS
2. IN THE PAST MONTH, HAVE YOU ACTUALLY HAD ANY THOUGHTS OF KILLING YOURSELF?: NO
1. IN THE PAST MONTH, HAVE YOU WISHED YOU WERE DEAD OR WISHED YOU COULD GO TO SLEEP AND NOT WAKE UP?: NO
6. IN YOUR LIFETIME, HAVE YOU EVER DONE ANYTHING, STARTED TO DO ANYTHING, OR PREPARED TO DO ANYTHING TO END YOUR LIFE?: NO

## 2025-01-30 NOTE — ASSESSMENT & PLAN NOTE
Orders:    [facility]    CBC with Auto Differential; Future    Comprehensive Metabolic Panel; Future    Iron and TIBC; Future    Ferritin; Future    Methylmalonic Acid, Serum; Future    Ammonia; Future    rifAXIMin (XIFAXAN) 550 MG tablet; Take 1 tablet by mouth 2 times daily

## 2025-01-30 NOTE — PATIENT INSTRUCTIONS
[employer] - Financial Resources*  (Call [facility] if you need more resources.)      Utility Assistance     [facility] Low Income Home Energy Assistance Program  They offer: energy assistance.  Contact: [phone]; [email or web address]  Helpful Info: Must be a [place] resident and need financial assistance with home energy costs.    [employer]   They offer: wide range of services to low and moderate-income residents in [facility]  Contact: [phone]; [address] [place] [address]    [facility]   They offer: basic needs for stability and support services.   Contact: [phone]; [address] [place] [address]     [facility]   They offer: help for families and individuals in need to pay rent and utility bills, purchase clothing, and food.    Contact: [phone]; [address] [place], [place] [address] [employer]   They offer:  assistance with utility bills   Contact:  [phone]; [address] [place] [address]       [employer]   They offer: Bill, clothes, food, and rent assistance.  Case Management and Counseling services available.   Contact: [phone] [facility] [place] [address]    Medical/Financial  [facility] Financial Assistance  They offer: help uninsured patients who do not qualify for government-sponsored health insurance and cannot afford to pay for their medical care. Insured patients may also qualify depending on family income, family size, and medical needs.   Contact: [phone]   Helpful Info: How to apply-  Option 1: Fill out the Financial Assistance Application(FAP). Copies of the Financial Assistance Application and the FAP may be obtained for free by calling the [employer] service department at [phone].   Option 2: download a copy from the [employer] website:

## 2025-01-30 NOTE — PROGRESS NOTES
Gibran Paniagua (:  1970) is a 54 y.o. male,Established patient, here for evaluation of the following chief complaint(s):  Follow-Up from Hospital (25 Hospital encounter)         Assessment & Plan  Decompensated cirrhosis (HCC)     On lactulose  Check ammonia    [facility]    [facility]    CBC with Auto Differential; Future    Comprehensive Metabolic Panel; Future    Iron and TIBC; Future    Ferritin; Future    Methylmalonic Acid, Serum; Future    Ammonia; Future    rifAXIMin (XIFAXAN) 550 MG tablet; Take 1 tablet by mouth 2 times daily    Alcoholic cirrhosis, unspecified whether ascites present (HCC)       Orders[facility]    [facility]    CBC with Auto Differential; Future    Comprehensive Metabolic Panel; Future    Iron and TIBC; Future    Ferritin; Future    Methylmalonic Acid, Serum; Future    Ammonia; Future    rifAXIMin (XIFAXAN) 550 MG tablet; Take 1 tablet by mouth 2 times daily    Acute kidney injury (HCC)   Check renal         Anemia, unspecified type    Had 2 units PRBC  Check iron and cbc  No continued melena or blood in stools  Refer gastro    Orders:    [facility]    CBC with Auto Differential; Future    Comprehensive Metabolic Panel; Future    Iron and TIBC; Future    Ferritin; Future    Methylmalonic Acid, Serum; Future    Tobacco abuse   Advise cessation, some rhonchi  Reviewed imaging from hospital  Get xray    Orders:    XR CHEST (2 VW); Future    [facility]    Leg swelling   Check bnp  Off diuretic    Orders:    Brain Natriuretic Peptide; Future      No follow-ups on file.       Subjective   Patient is here for hospital follow up. He went -   He had acute GI bleed and melena and anemia with Hgb around 5. He got 2 PRBC. He had encephalopathy in hospital. He was put on lactulose and xifaxin. He hasn't

## 2025-02-27 ENCOUNTER — OFFICE VISIT (OUTPATIENT)
Dept: INTERNAL MEDICINE CLINIC | Facility: CLINIC | Age: 55
End: 2025-02-27
Payer: COMMERCIAL

## 2025-02-27 VITALS
HEART RATE: 52 BPM | HEIGHT: 68 IN | BODY MASS INDEX: 25.46 KG/M2 | DIASTOLIC BLOOD PRESSURE: 80 MMHG | SYSTOLIC BLOOD PRESSURE: 128 MMHG | WEIGHT: 168 LBS | OXYGEN SATURATION: 99 % | TEMPERATURE: 98.9 F

## 2025-02-27 DIAGNOSIS — E78.2 MIXED HYPERLIPIDEMIA: ICD-10-CM

## 2025-02-27 DIAGNOSIS — K74.60 DECOMPENSATED CIRRHOSIS (HCC): ICD-10-CM

## 2025-02-27 DIAGNOSIS — K70.30 ALCOHOLIC CIRRHOSIS, UNSPECIFIED WHETHER ASCITES PRESENT (HCC): ICD-10-CM

## 2025-02-27 DIAGNOSIS — C67.9 MALIGNANT NEOPLASM OF URINARY BLADDER, UNSPECIFIED SITE (HCC): ICD-10-CM

## 2025-02-27 DIAGNOSIS — Z85.51 HISTORY OF BLADDER CANCER: ICD-10-CM

## 2025-02-27 DIAGNOSIS — K72.90 DECOMPENSATED CIRRHOSIS (HCC): ICD-10-CM

## 2025-02-27 DIAGNOSIS — Z72.0 TOBACCO ABUSE: ICD-10-CM

## 2025-02-27 DIAGNOSIS — D64.9 ANEMIA, UNSPECIFIED TYPE: Primary | ICD-10-CM

## 2025-02-27 DIAGNOSIS — Z12.5 PROSTATE CANCER SCREENING: ICD-10-CM

## 2025-02-27 DIAGNOSIS — I10 PRIMARY HYPERTENSION: ICD-10-CM

## 2025-02-27 LAB
ALBUMIN SERPL-MCNC: 3.6 G/DL (ref 3.5–5)
ALBUMIN/GLOB SERPL: 0.9 (ref 1–1.9)
ALP SERPL-CCNC: 153 U/L (ref 40–129)
ALT SERPL-CCNC: 21 U/L (ref 8–55)
AMMONIA PLAS-SCNC: 54 UMOL/L (ref 16–60)
ANION GAP SERPL CALC-SCNC: 10 MMOL/L (ref 7–16)
AST SERPL-CCNC: 34 U/L (ref 15–37)
BASOPHILS # BLD: 0.08 K/UL (ref 0–0.2)
BASOPHILS NFR BLD: 1.3 % (ref 0–2)
BILIRUB SERPL-MCNC: 0.5 MG/DL (ref 0–1.2)
BUN SERPL-MCNC: 13 MG/DL (ref 6–23)
CALCIUM SERPL-MCNC: 9.3 MG/DL (ref 8.8–10.2)
CHLORIDE SERPL-SCNC: 107 MMOL/L (ref 98–107)
CO2 SERPL-SCNC: 22 MMOL/L (ref 20–29)
CREAT SERPL-MCNC: 0.79 MG/DL (ref 0.8–1.3)
DIFFERENTIAL METHOD BLD: ABNORMAL
EOSINOPHIL # BLD: 0.31 K/UL (ref 0–0.8)
EOSINOPHIL NFR BLD: 4.9 % (ref 0.5–7.8)
ERYTHROCYTE [DISTWIDTH] IN BLOOD BY AUTOMATED COUNT: 17.6 % (ref 11.9–14.6)
FERRITIN SERPL-MCNC: 19 NG/ML (ref 8–388)
GLOBULIN SER CALC-MCNC: 3.8 G/DL (ref 2.3–3.5)
GLUCOSE SERPL-MCNC: 122 MG/DL (ref 70–99)
HCT VFR BLD AUTO: 26.7 % (ref 41.1–50.3)
HGB BLD-MCNC: 8.3 G/DL (ref 13.6–17.2)
IMM GRANULOCYTES # BLD AUTO: 0.01 K/UL (ref 0–0.5)
IMM GRANULOCYTES NFR BLD AUTO: 0.2 % (ref 0–5)
IRON SATN MFR SERPL: 6 % (ref 20–50)
IRON SERPL-MCNC: 27 UG/DL (ref 35–100)
LYMPHOCYTES # BLD: 2.05 K/UL (ref 0.5–4.6)
LYMPHOCYTES NFR BLD: 32.6 % (ref 13–44)
MCH RBC QN AUTO: 26.6 PG (ref 26.1–32.9)
MCHC RBC AUTO-ENTMCNC: 31.1 G/DL (ref 31.4–35)
MCV RBC AUTO: 85.6 FL (ref 82–102)
MONOCYTES # BLD: 1.21 K/UL (ref 0.1–1.3)
MONOCYTES NFR BLD: 19.2 % (ref 4–12)
NEUTS SEG # BLD: 2.63 K/UL (ref 1.7–8.2)
NEUTS SEG NFR BLD: 41.8 % (ref 43–78)
NRBC # BLD: 0 K/UL (ref 0–0.2)
NT PRO BNP: 332 PG/ML (ref 0–125)
PLATELET # BLD AUTO: 167 K/UL (ref 150–450)
PMV BLD AUTO: 12.8 FL (ref 9.4–12.3)
POTASSIUM SERPL-SCNC: 3.8 MMOL/L (ref 3.5–5.1)
PROT SERPL-MCNC: 7.3 G/DL (ref 6.3–8.2)
PSA SERPL-MCNC: 0.4 NG/ML (ref 0–4)
RBC # BLD AUTO: 3.12 M/UL (ref 4.23–5.6)
SODIUM SERPL-SCNC: 140 MMOL/L (ref 136–145)
TIBC SERPL-MCNC: 447 UG/DL (ref 240–450)
UIBC SERPL-MCNC: 420 UG/DL (ref 112–347)
WBC # BLD AUTO: 6.3 K/UL (ref 4.3–11.1)

## 2025-02-27 PROCEDURE — 3074F SYST BP LT 130 MM HG: CPT | Performed by: NURSE PRACTITIONER

## 2025-02-27 PROCEDURE — 3079F DIAST BP 80-89 MM HG: CPT | Performed by: NURSE PRACTITIONER

## 2025-02-27 PROCEDURE — 99214 OFFICE O/P EST MOD 30 MIN: CPT | Performed by: NURSE PRACTITIONER

## 2025-02-27 RX ORDER — FUROSEMIDE 40 MG/1
40 TABLET ORAL 2 TIMES DAILY
Qty: 60 TABLET | Refills: 1 | Status: SHIPPED | OUTPATIENT
Start: 2025-02-27 | End: 2026-02-27

## 2025-02-27 RX ORDER — FLUOXETINE 10 MG/1
10 CAPSULE ORAL DAILY
Qty: 30 CAPSULE | Refills: 5 | Status: SHIPPED | OUTPATIENT
Start: 2025-02-27

## 2025-02-27 RX ORDER — FERROUS SULFATE 325(65) MG
325 TABLET ORAL 2 TIMES DAILY
Qty: 60 TABLET | Refills: 5 | Status: SHIPPED | OUTPATIENT
Start: 2025-02-27

## 2025-02-27 NOTE — ASSESSMENT & PLAN NOTE
Orders:    Albumin/Creatinine Ratio, Urine; Future    Hemoglobin A1C; Future    Lipid Panel; Future    TSH; Future    Comprehensive Metabolic Panel; Future    CBC; Future    Ammonia; Future

## 2025-02-27 NOTE — ASSESSMENT & PLAN NOTE
Bp controlled    Orders:    CBC with Auto Differential; Future    Comprehensive Metabolic Panel; Future    Iron and TIBC; Future    Ferritin; Future    Brain Natriuretic Peptide; Future    Ammonia; Future    Ammonia    Brain Natriuretic Peptide    Ferritin    Iron and TIBC    Comprehensive Metabolic Panel    CBC with Auto Differential    Albumin/Creatinine Ratio, Urine; Future    Hemoglobin A1C; Future    Lipid Panel; Future    TSH; Future    Comprehensive Metabolic Panel; Future    CBC; Future    Ammonia; Future

## 2025-02-27 NOTE — PROGRESS NOTES
Gibran Paniagua (:  1970) is a 54 y.o. male,Established patient, here for evaluation of the following chief complaint(s):  Follow-up (4 weeks)         Assessment & Plan  Decompensated cirrhosis (HCC)   Seeing gastro next week  Check ammonia  Unable to get xifaxan due to cost  Orders:    rifAXIMin (XIFAXAN) 550 MG tablet; Take 1 tablet by mouth 2 times daily    CBC with Auto Differential; Future    Comprehensive Metabolic Panel; Future    Iron and TIBC; Future    Ferritin; Future    Brain Natriuretic Peptide; Future    Ammonia; Future    Ammonia    Brain Natriuretic Peptide    Ferritin    Iron and TIBC    Comprehensive Metabolic Panel    CBC with Auto Differential    Alcoholic cirrhosis, unspecified whether ascites present (HCC)       Orders:    rifAXIMin (XIFAXAN) 550 MG tablet; Take 1 tablet by mouth 2 times daily    CBC with Auto Differential; Future    Comprehensive Metabolic Panel; Future    Iron and TIBC; Future    Ferritin; Future    Brain Natriuretic Peptide; Future    Ammonia; Future    Ammonia    Brain Natriuretic Peptide    Ferritin    Iron and TIBC    Comprehensive Metabolic Panel    CBC with Auto Differential    Anemia, unspecified type   Recheck iron and anemia  Start iron- last hgb and iron were low    Orders:    CBC with Auto Differential; Future    Comprehensive Metabolic Panel; Future    Iron and TIBC; Future    Ferritin; Future    Brain Natriuretic Peptide; Future    Ammonia; Future    Ammonia    Brain Natriuretic Peptide    Ferritin    Iron and TIBC    Comprehensive Metabolic Panel    CBC with Auto Differential    Albumin/Creatinine Ratio, Urine; Future    Hemoglobin A1C; Future    Lipid Panel; Future    TSH; Future    Comprehensive Metabolic Panel; Future    CBC; Future    Ammonia; Future    Primary hypertension   Bp controlled    Orders:    CBC with Auto Differential; Future    Comprehensive Metabolic Panel; Future    Iron and TIBC; Future    Ferritin; Future    Brain Natriuretic

## 2025-02-27 NOTE — ASSESSMENT & PLAN NOTE
Orders:    rifAXIMin (XIFAXAN) 550 MG tablet; Take 1 tablet by mouth 2 times daily    CBC with Auto Differential; Future    Comprehensive Metabolic Panel; Future    Iron and TIBC; Future    Ferritin; Future    Brain Natriuretic Peptide; Future    Ammonia; Future    Ammonia    Brain Natriuretic Peptide    Ferritin    Iron and TIBC    Comprehensive Metabolic Panel    CBC with Auto Differential

## 2025-03-03 DIAGNOSIS — D50.9 IRON DEFICIENCY ANEMIA, UNSPECIFIED IRON DEFICIENCY ANEMIA TYPE: Primary | ICD-10-CM

## 2025-03-05 ENCOUNTER — TELEPHONE (OUTPATIENT)
Dept: INTERNAL MEDICINE CLINIC | Facility: CLINIC | Age: 55
End: 2025-03-05

## 2025-03-05 NOTE — TELEPHONE ENCOUNTER
Per Elida Iraheta Mr Tao needs to see hematology. continue iron. keep gastro appts     I attempted to contact patient, and it states he has calling restrictions preventing the call.   I will try again later.

## 2025-03-05 NOTE — TELEPHONE ENCOUNTER
Attempted to contact patient again, and it states he has calling restrictions preventing the call.

## 2025-03-27 NOTE — PROGRESS NOTES
NEW PATIENT ABSTRACT      Referral Diagnosis: iron deficiency anemia    Referring Provider: Elida Vidal APRN - CNP    Primary Care Provider: Elida Vidal APRN - CNP    Presenting Symptoms: none; hospital follow-up    Family History of Cancer: Cancer-related family history includes Esophageal Cancer in his father.    Past Medical History:   Past Medical History:   Diagnosis Date    GERD (gastroesophageal reflux disease)     Hepatitis C     History of bladder cancer     HTN (hypertension)     Hyperlipidemia        Chronological History of Pertinent Events (From Onset of Presenting Symptoms): Pt is a 53 yo male with a history of alcoholic cirrhosis and hep C.   -1/20/25: Presented to ED for a GI bleed and a BP of 80/40. Hgb 5.7 upon admission. Received 5 units pRBCs total. EGD performed and found to have esophagitis, gastric varices, and duodenal ulcers. Developed encephalopathy after EGD and was admitted to ICU.  -1/29/25: Discharged from hospital.  -1/30/25: Presented to PCP for hospital follow-up.  -1/30/25 labs:      -2/27/25 labs:       -3/12/25 labs:     Record located in Epic.      Pertinent Notes from Referring Provider: Instructed pt to start taking oral iron as prescribed.    Other Pertinent Information: Pt takes Ibuprofen daily for shoulder pain from a recent fracture.

## 2025-04-03 ENCOUNTER — HOSPITAL ENCOUNTER (OUTPATIENT)
Dept: LAB | Age: 55
Discharge: HOME | End: 2025-04-03
Payer: COMMERCIAL

## 2025-04-03 ENCOUNTER — OFFICE VISIT (OUTPATIENT)
Dept: ONCOLOGY | Age: 55
End: 2025-04-03

## 2025-04-03 VITALS
DIASTOLIC BLOOD PRESSURE: 83 MMHG | TEMPERATURE: 97.9 F | OXYGEN SATURATION: 99 % | SYSTOLIC BLOOD PRESSURE: 136 MMHG | HEIGHT: 68 IN | HEART RATE: 45 BPM | RESPIRATION RATE: 16 BRPM | WEIGHT: 168.8 LBS | BODY MASS INDEX: 25.58 KG/M2

## 2025-04-03 DIAGNOSIS — E55.9 VITAMIN D DEFICIENCY: ICD-10-CM

## 2025-04-03 DIAGNOSIS — K74.69 OTHER CIRRHOSIS OF LIVER: ICD-10-CM

## 2025-04-03 DIAGNOSIS — D50.8 OTHER IRON DEFICIENCY ANEMIAS: Primary | ICD-10-CM

## 2025-04-03 DIAGNOSIS — D50.8 OTHER IRON DEFICIENCY ANEMIAS: ICD-10-CM

## 2025-04-03 LAB
25(OH)D3 SERPL-MCNC: 29.4 NG/ML (ref 30–100)
ALBUMIN SERPL-MCNC: 3.8 G/DL (ref 3.5–5)
ALBUMIN/GLOB SERPL: 0.8 (ref 1–1.9)
ALP SERPL-CCNC: 158 U/L (ref 40–129)
ALT SERPL-CCNC: 19 U/L (ref 8–55)
ANION GAP SERPL CALC-SCNC: 14 MMOL/L (ref 7–16)
AST SERPL-CCNC: 41 U/L (ref 15–37)
BASOPHILS # BLD: 0.13 K/UL (ref 0–0.2)
BASOPHILS NFR BLD: 1.6 % (ref 0–2)
BILIRUB SERPL-MCNC: 0.6 MG/DL (ref 0–1.2)
BUN SERPL-MCNC: 17 MG/DL (ref 6–23)
CALCIUM SERPL-MCNC: 9.3 MG/DL (ref 8.8–10.2)
CHLORIDE SERPL-SCNC: 103 MMOL/L (ref 98–107)
CO2 SERPL-SCNC: 20 MMOL/L (ref 20–29)
CREAT SERPL-MCNC: 0.87 MG/DL (ref 0.8–1.3)
DIFFERENTIAL METHOD BLD: ABNORMAL
EOSINOPHIL # BLD: 0.39 K/UL (ref 0–0.8)
EOSINOPHIL NFR BLD: 4.8 % (ref 0.5–7.8)
FERRITIN SERPL-MCNC: 81 NG/ML (ref 8–388)
GLOBULIN SER CALC-MCNC: 4.7 G/DL (ref 2.3–3.5)
GLUCOSE SERPL-MCNC: 89 MG/DL (ref 70–99)
HCT VFR BLD AUTO: 40.9 % (ref 41.1–50.3)
HGB BLD-MCNC: 13 G/DL (ref 13.6–17.2)
HIV 1+2 AB+HIV1 P24 AG SERPL QL IA: NONREACTIVE
HIV 1/2 RESULT COMMENT: NORMAL
IMM GRANULOCYTES # BLD AUTO: 0.03 K/UL (ref 0–0.5)
IMM GRANULOCYTES NFR BLD AUTO: 0.4 % (ref 0–5)
LYMPHOCYTES # BLD: 2.4 K/UL (ref 0.5–4.6)
LYMPHOCYTES NFR BLD: 29.3 % (ref 13–44)
MCH RBC QN AUTO: 28.4 PG (ref 26.1–32.9)
MCHC RBC AUTO-ENTMCNC: 31.8 G/DL (ref 31.4–35)
MCV RBC AUTO: 89.3 FL (ref 82–102)
MONOCYTES # BLD: 1.14 K/UL (ref 0.1–1.3)
MONOCYTES NFR BLD: 13.9 % (ref 4–12)
NEUTS SEG # BLD: 4.1 K/UL (ref 1.7–8.2)
NEUTS SEG NFR BLD: 50 % (ref 43–78)
NRBC # BLD: 0 K/UL (ref 0–0.2)
PLATELET # BLD AUTO: 143 K/UL (ref 150–450)
PLATELET COMMENT: ADEQUATE
PMV BLD AUTO: 12 FL (ref 9.4–12.3)
POTASSIUM SERPL-SCNC: 3.7 MMOL/L (ref 3.5–5.1)
PROT SERPL-MCNC: 8.5 G/DL (ref 6.3–8.2)
RBC # BLD AUTO: 4.58 M/UL (ref 4.23–5.6)
RBC MORPH BLD: ABNORMAL
SODIUM SERPL-SCNC: 137 MMOL/L (ref 136–145)
VIT B12 SERPL-MCNC: 1604 PG/ML (ref 193–986)
WBC # BLD AUTO: 8.2 K/UL (ref 4.3–11.1)
WBC MORPH BLD: ABNORMAL

## 2025-04-03 PROCEDURE — 82607 VITAMIN B-12: CPT

## 2025-04-03 PROCEDURE — 82728 ASSAY OF FERRITIN: CPT

## 2025-04-03 PROCEDURE — 0077U IG PARAPROTEIN QUAL BLD/UR: CPT

## 2025-04-03 PROCEDURE — 82306 VITAMIN D 25 HYDROXY: CPT

## 2025-04-03 PROCEDURE — 80053 COMPREHEN METABOLIC PANEL: CPT

## 2025-04-03 PROCEDURE — 36415 COLL VENOUS BLD VENIPUNCTURE: CPT

## 2025-04-03 PROCEDURE — 82784 ASSAY IGA/IGD/IGG/IGM EACH: CPT

## 2025-04-03 PROCEDURE — 87389 HIV-1 AG W/HIV-1&-2 AB AG IA: CPT

## 2025-04-03 PROCEDURE — 85025 COMPLETE CBC W/AUTO DIFF WBC: CPT

## 2025-04-03 PROCEDURE — 84425 ASSAY OF VITAMIN B-1: CPT

## 2025-04-03 RX ORDER — ACETAMINOPHEN 325 MG/1
650 TABLET ORAL ONCE
OUTPATIENT
Start: 2025-04-12 | End: 2025-04-12

## 2025-04-03 RX ORDER — DIPHENHYDRAMINE HYDROCHLORIDE 50 MG/ML
50 INJECTION, SOLUTION INTRAMUSCULAR; INTRAVENOUS
OUTPATIENT
Start: 2025-04-12

## 2025-04-03 RX ORDER — SODIUM CHLORIDE 0.9 % (FLUSH) 0.9 %
5-40 SYRINGE (ML) INJECTION PRN
OUTPATIENT
Start: 2025-04-12

## 2025-04-03 RX ORDER — SODIUM CHLORIDE 9 MG/ML
5-250 INJECTION, SOLUTION INTRAVENOUS PRN
OUTPATIENT
Start: 2025-04-12

## 2025-04-03 RX ORDER — MEPERIDINE HYDROCHLORIDE 50 MG/ML
12.5 INJECTION INTRAMUSCULAR; INTRAVENOUS; SUBCUTANEOUS PRN
OUTPATIENT
Start: 2025-04-12

## 2025-04-03 RX ORDER — EPINEPHRINE 1 MG/ML
0.3 INJECTION, SOLUTION, CONCENTRATE INTRAVENOUS PRN
OUTPATIENT
Start: 2025-04-12

## 2025-04-03 RX ORDER — SODIUM CHLORIDE 9 MG/ML
INJECTION, SOLUTION INTRAVENOUS CONTINUOUS
OUTPATIENT
Start: 2025-04-12

## 2025-04-03 RX ORDER — ALBUTEROL SULFATE 90 UG/1
4 INHALANT RESPIRATORY (INHALATION) PRN
OUTPATIENT
Start: 2025-04-12

## 2025-04-03 RX ORDER — ACETAMINOPHEN 325 MG/1
650 TABLET ORAL
OUTPATIENT
Start: 2025-04-12

## 2025-04-03 RX ORDER — ONDANSETRON 2 MG/ML
8 INJECTION INTRAMUSCULAR; INTRAVENOUS
OUTPATIENT
Start: 2025-04-12

## 2025-04-03 RX ORDER — HEPARIN SODIUM (PORCINE) LOCK FLUSH IV SOLN 100 UNIT/ML 100 UNIT/ML
500 SOLUTION INTRAVENOUS PRN
OUTPATIENT
Start: 2025-04-12

## 2025-04-03 RX ORDER — HYDROCORTISONE SODIUM SUCCINATE 100 MG/2ML
100 INJECTION INTRAMUSCULAR; INTRAVENOUS
OUTPATIENT
Start: 2025-04-12

## 2025-04-03 RX ORDER — FAMOTIDINE 10 MG/ML
20 INJECTION, SOLUTION INTRAVENOUS
OUTPATIENT
Start: 2025-04-12

## 2025-04-03 NOTE — PROGRESS NOTES
[address] [address]  Phone: [phone]        4/3/2025  Gibran Paniagua  1970  [id number]      Gibran Paniagua is a 54 year old  man who has been sent to my clinic by Elida Vidal NP for management of Iron Deficiency Anemia, he did not respond to oral Iron supplements.      ALLERGIES:    No known drug allergies.      FAMILY HISTORY:     No hematologic disorders.      SOCIAL HISTORY:    He is  and lives with his girlfriend. He used to work in the construction industry. He smokes 15 cigarettes every day.      PAST MEDICAL HISTORY:    Hypertension, GERD, Anxiety Disorder, Bladder Cancer, Alcoholism, Duodenal Ulcer, Hyperlipidemia, Iron Deficiency Anemia, Depression, HCV infection and cirrhosis of the liver.      ROS:  The patient complained of fatigue; all other systems negative.       PHYSICAL EXAM:   The patient was alert, awake and oriented, no acute distress was noted. Oral examination did not reveal any mucosal lesions. Lymph node examination did not reveal any adenopathy. Neck examination revealed a supple neck, no thyromegaly or masses were noted. Chest examination revealed slightly prolonged expiration bilaterally. Heart examination revealed S-1 and S-2 without any murmurs. Abdominal examination revealed a non-tender abdomen, bowel sounds were positive, no organomegaly could be appreciated. Examination of the extremities did not reveal any tenderness or erythema. Examination of the skin did not reveal any lesions.      KPS:   60.      LABORATORY INVESTIGATIONS:  CBC showed a WBC count of 8.2, ANC was 4.1, Hemoglobin was 13.1 and Platelets were 143; his Ferritin level was pending. Medical problems and test results were reviewed with the patient today.      ASSESSMENT:    Iron Deficiency Anemia; cirrhosis of liver; Vitamin D deficiency.      PLAN:   He should start taking supplemental Vitamin D. I will arrange for him to receive Infed, his

## 2025-04-03 NOTE — PATIENT INSTRUCTIONS
Patient Information from Today's Visit    The members of your [facility] are listed below:    Physician Provider: Magdi Olson Medical Oncologist  Advanced Practice Clinician: Alice Alvarenga NP  Registered Nurse: Yeni GALLOWAY   Navigator: N/A  Medical Assistant: Jeannette BRIDGES MA and Sydnee BARRAGAN MA  : Maddie IRELAND   Supportive Care Services: Torsten THOMPSON LMSW    Diagnosis: New Patient --Iron Deficiency Anemia      Follow Up Instructions:     -Labs reviewed.   -You will need to have an iron infusion called Infed. Our infusion schedulers will call you to get this appointment scheduled.   -We will recheck your labs and follow up     Treatment Summary has been discussed and given to patient:N/A      Current Labs:   No visits with results within 3 Day(s) from this visit.   Latest known visit with results is:   Office Visit on 02/27/2025   Component Date Value Ref Range Status    PSA 02/27/2025 0.4  0.0 - 4.0 ng/mL Final    Comment: Roche ECLIA methodology  Patient's results of tumor marker testing may not be comparable to labs using different manufacturers/methods.      Ammonia 02/27/2025 54  16 - 60 umol/L Final    NT Pro-BNP 02/27/2025 332 (H)  0 - 125 PG/ML Final    Ferritin 02/27/2025 19  8 - 388 NG/ML Final    Iron 02/27/2025 27 (L)  35 - 100 ug/dL Final    TIBC 02/27/2025 447  240 - 450 ug/dL Final    Iron % Saturation 02/27/2025 6 (L)  20 - 50 % Final    UIBC 02/27/2025 420.0 (H)  112.0 - 347.0 ug/dL Final    Sodium 02/27/2025 140  136 - 145 mmol/L Final    Potassium 02/27/2025 3.8  3.5 - 5.1 mmol/L Final    Chloride 02/27/2025 107  98 - 107 mmol/L Final    CO2 02/27/2025 22  20 - 29 mmol/L Final    Anion Gap 02/27/2025 10  7 - 16 mmol/L Final    Glucose 02/27/2025 122 (H)  70 - 99 mg/dL Final    Comment: <70 mg/dL Consistent with, but not fully diagnostic of hypoglycemia.  100 - 125 mg/dL Impaired fasting glucose/consistent with pre-diabetes mellitus.  > 126 mg/dl Fasting glucose consistent with overt

## 2025-04-05 LAB — VIT B1 BLD-SCNC: 169 NMOL/L (ref 66.5–200)

## 2025-04-08 LAB — IMMUNOLOGIST REVIEW: NORMAL

## 2025-04-19 ENCOUNTER — HOSPITAL ENCOUNTER (OUTPATIENT)
Dept: INFUSION THERAPY | Age: 55
Setting detail: INFUSION SERIES
Discharge: HOME | End: 2025-04-19
Payer: COMMERCIAL

## 2025-04-19 VITALS
DIASTOLIC BLOOD PRESSURE: 85 MMHG | WEIGHT: 171.2 LBS | TEMPERATURE: 97.8 F | SYSTOLIC BLOOD PRESSURE: 137 MMHG | BODY MASS INDEX: 26.03 KG/M2 | OXYGEN SATURATION: 100 % | HEART RATE: 53 BPM | RESPIRATION RATE: 18 BRPM

## 2025-04-19 DIAGNOSIS — D50.8 OTHER IRON DEFICIENCY ANEMIAS: Primary | ICD-10-CM

## 2025-04-19 PROCEDURE — 96375 TX/PRO/DX INJ NEW DRUG ADDON: CPT

## 2025-04-19 PROCEDURE — 6360000002 HC RX W HCPCS: Performed by: INTERNAL MEDICINE

## 2025-04-19 PROCEDURE — 96365 THER/PROPH/DIAG IV INF INIT: CPT

## 2025-04-19 PROCEDURE — 96366 THER/PROPH/DIAG IV INF ADDON: CPT

## 2025-04-19 PROCEDURE — 96376 TX/PRO/DX INJ SAME DRUG ADON: CPT

## 2025-04-19 PROCEDURE — 2580000003 HC RX 258: Performed by: INTERNAL MEDICINE

## 2025-04-19 PROCEDURE — 6370000000 HC RX 637 (ALT 250 FOR IP): Performed by: INTERNAL MEDICINE

## 2025-04-19 RX ORDER — ACETAMINOPHEN 325 MG/1
650 TABLET ORAL ONCE
Status: COMPLETED | OUTPATIENT
Start: 2025-04-19 | End: 2025-04-19

## 2025-04-19 RX ORDER — SODIUM CHLORIDE 9 MG/ML
5-250 INJECTION, SOLUTION INTRAVENOUS PRN
Status: DISCONTINUED | OUTPATIENT
Start: 2025-04-19 | End: 2025-04-20 | Stop reason: HOSPADM

## 2025-04-19 RX ORDER — ALBUTEROL SULFATE 90 UG/1
4 INHALANT RESPIRATORY (INHALATION) PRN
Status: DISCONTINUED | OUTPATIENT
Start: 2025-04-19 | End: 2025-04-20 | Stop reason: HOSPADM

## 2025-04-19 RX ORDER — EPINEPHRINE 1 MG/ML
0.3 INJECTION, SOLUTION, CONCENTRATE INTRAVENOUS PRN
OUTPATIENT
Start: 2025-04-19

## 2025-04-19 RX ORDER — HYDROCORTISONE SODIUM SUCCINATE 100 MG/2ML
100 INJECTION INTRAMUSCULAR; INTRAVENOUS
Status: DISCONTINUED | OUTPATIENT
Start: 2025-04-19 | End: 2025-04-20 | Stop reason: HOSPADM

## 2025-04-19 RX ORDER — HYDROCORTISONE SODIUM SUCCINATE 100 MG/2ML
100 INJECTION INTRAMUSCULAR; INTRAVENOUS
OUTPATIENT
Start: 2025-04-19

## 2025-04-19 RX ORDER — SODIUM CHLORIDE 9 MG/ML
5-250 INJECTION, SOLUTION INTRAVENOUS PRN
OUTPATIENT
Start: 2025-04-19

## 2025-04-19 RX ORDER — SODIUM CHLORIDE 0.9 % (FLUSH) 0.9 %
5-40 SYRINGE (ML) INJECTION PRN
OUTPATIENT
Start: 2025-04-19

## 2025-04-19 RX ORDER — DIPHENHYDRAMINE HYDROCHLORIDE 50 MG/ML
50 INJECTION, SOLUTION INTRAMUSCULAR; INTRAVENOUS
OUTPATIENT
Start: 2025-04-19

## 2025-04-19 RX ORDER — ACETAMINOPHEN 325 MG/1
650 TABLET ORAL ONCE
Status: CANCELLED | OUTPATIENT
Start: 2025-04-19 | End: 2025-04-19

## 2025-04-19 RX ORDER — ACETAMINOPHEN 325 MG/1
650 TABLET ORAL
OUTPATIENT
Start: 2025-04-19

## 2025-04-19 RX ORDER — ONDANSETRON 2 MG/ML
8 INJECTION INTRAMUSCULAR; INTRAVENOUS
OUTPATIENT
Start: 2025-04-19

## 2025-04-19 RX ORDER — MEPERIDINE HYDROCHLORIDE 25 MG/ML
12.5 INJECTION INTRAMUSCULAR; INTRAVENOUS; SUBCUTANEOUS PRN
Status: DISCONTINUED | OUTPATIENT
Start: 2025-04-19 | End: 2025-04-20 | Stop reason: HOSPADM

## 2025-04-19 RX ORDER — ONDANSETRON 2 MG/ML
8 INJECTION INTRAMUSCULAR; INTRAVENOUS
Status: DISCONTINUED | OUTPATIENT
Start: 2025-04-19 | End: 2025-04-20 | Stop reason: HOSPADM

## 2025-04-19 RX ORDER — DEXAMETHASONE SODIUM PHOSPHATE 10 MG/ML
10 INJECTION, SOLUTION INTRA-ARTICULAR; INTRALESIONAL; INTRAMUSCULAR; INTRAVENOUS; SOFT TISSUE ONCE
Status: COMPLETED | OUTPATIENT
Start: 2025-04-19 | End: 2025-04-19

## 2025-04-19 RX ORDER — DIPHENHYDRAMINE HYDROCHLORIDE 50 MG/ML
50 INJECTION, SOLUTION INTRAMUSCULAR; INTRAVENOUS
Status: DISCONTINUED | OUTPATIENT
Start: 2025-04-19 | End: 2025-04-20 | Stop reason: HOSPADM

## 2025-04-19 RX ORDER — SODIUM CHLORIDE 9 MG/ML
INJECTION, SOLUTION INTRAVENOUS CONTINUOUS
OUTPATIENT
Start: 2025-04-19

## 2025-04-19 RX ORDER — EPINEPHRINE 1 MG/ML
0.3 INJECTION, SOLUTION, CONCENTRATE INTRAVENOUS PRN
Status: DISCONTINUED | OUTPATIENT
Start: 2025-04-19 | End: 2025-04-20 | Stop reason: HOSPADM

## 2025-04-19 RX ORDER — ALBUTEROL SULFATE 90 UG/1
4 INHALANT RESPIRATORY (INHALATION) PRN
OUTPATIENT
Start: 2025-04-19

## 2025-04-19 RX ORDER — HEPARIN 100 UNIT/ML
500 SYRINGE INTRAVENOUS PRN
OUTPATIENT
Start: 2025-04-19

## 2025-04-19 RX ORDER — DEXAMETHASONE SODIUM PHOSPHATE 10 MG/ML
10 INJECTION, SOLUTION INTRA-ARTICULAR; INTRALESIONAL; INTRAMUSCULAR; INTRAVENOUS; SOFT TISSUE ONCE
Status: CANCELLED | OUTPATIENT
Start: 2025-04-19 | End: 2025-04-19

## 2025-04-19 RX ORDER — SODIUM CHLORIDE 9 MG/ML
5-250 INJECTION, SOLUTION INTRAVENOUS PRN
Status: CANCELLED | OUTPATIENT
Start: 2025-04-19

## 2025-04-19 RX ORDER — MEPERIDINE HYDROCHLORIDE 25 MG/ML
12.5 INJECTION INTRAMUSCULAR; INTRAVENOUS; SUBCUTANEOUS PRN
OUTPATIENT
Start: 2025-04-19

## 2025-04-19 RX ORDER — SODIUM CHLORIDE 9 MG/ML
INJECTION, SOLUTION INTRAVENOUS CONTINUOUS
Status: DISCONTINUED | OUTPATIENT
Start: 2025-04-19 | End: 2025-04-20 | Stop reason: HOSPADM

## 2025-04-19 RX ORDER — ACETAMINOPHEN 325 MG/1
650 TABLET ORAL
Status: DISCONTINUED | OUTPATIENT
Start: 2025-04-19 | End: 2025-04-20 | Stop reason: HOSPADM

## 2025-04-19 RX ADMIN — ACETAMINOPHEN 650 MG: 325 TABLET ORAL at 09:24

## 2025-04-19 RX ADMIN — SODIUM CHLORIDE 75 ML/HR: 0.9 INJECTION, SOLUTION INTRAVENOUS at 08:17

## 2025-04-19 RX ADMIN — SODIUM CHLORIDE 25 MG: 9 INJECTION, SOLUTION INTRAVENOUS at 08:18

## 2025-04-19 RX ADMIN — SODIUM CHLORIDE 975 MG: 9 INJECTION, SOLUTION INTRAVENOUS at 10:08

## 2025-04-19 RX ADMIN — DEXAMETHASONE SODIUM PHOSPHATE 10 MG: 10 INJECTION INTRAMUSCULAR; INTRAVENOUS at 09:24

## 2025-04-19 NOTE — PROGRESS NOTES
Arrived to the Infusion Center.  Infed completed. Patient tolerated well.   Any issues or concerns during appointment: None.  Patient aware no future infusion appointments  Patient aware of next lab and [facility] office visit on 5/23 (date) at 1100 (time).  Patient instructed to call provider with temperature of 100.4 or greater or nausea/vomiting/ diarrhea or pain not controlled by medications  Discharged ambulatory in stable condition.

## 2025-04-30 ENCOUNTER — TELEPHONE (OUTPATIENT)
Dept: INTERNAL MEDICINE CLINIC | Facility: CLINIC | Age: 55
End: 2025-04-30

## 2025-04-30 RX ORDER — PANTOPRAZOLE SODIUM 40 MG/1
40 TABLET, DELAYED RELEASE ORAL DAILY
Qty: 30 TABLET | Refills: 1 | Status: CANCELLED | OUTPATIENT
Start: 2025-04-30

## 2025-04-30 RX ORDER — FUROSEMIDE 40 MG/1
40 TABLET ORAL 2 TIMES DAILY
Qty: 60 TABLET | Refills: 1 | Status: CANCELLED | OUTPATIENT
Start: 2025-04-30 | End: 2026-04-30

## 2025-04-30 NOTE — TELEPHONE ENCOUNTER
Needs refills on these 4 medications    furosemide (LASIX) 40 MG tablet [[id number]]    Order Details  Dose: 40 mg Route: Oral Frequency: 2 TIMES DAILY   Dispense Quantity: 60 tablet Refills: 1          Sig: Take 1 tablet by mouth 2 times daily   And  folic acid (FOLVITE) 1 MG tablet [[id number]]      Order Details  Dose: 1 mg Route: Oral Frequency: DAILY   Dispense Quantity: -- Refills: --          Sig: Take 1 tablet by mouth daily   And    carvedilol (COREG) 3.125 MG tablet    Order Details  Dose: 3.125 mg Route: Oral Frequency: 2 TIMES DAILY WITH MEALS   Dispense Quantity: -- Refills: --          Sig: Take 1 tablet by mouth 2 times daily (with meals)   And    pantoprazole (PROTONIX) 40 MG tablet [[id number]]    Order Details  Dose: 40 mg Route: Oral Frequency: DAILY   Dispense Quantity: -- Refills: --          Sig: Take 1 tablet by mouth daily     Send to   [id number] [place] [address] [facility] [address] [facility] - P [phone] - F [phone]  [address] [facility] [address]  Phone: [phone]  Fax: [phone]     Please call patient and advise when these are sent in

## 2025-05-01 RX ORDER — FUROSEMIDE 40 MG/1
40 TABLET ORAL 2 TIMES DAILY
Qty: 60 TABLET | Refills: 1 | Status: SHIPPED | OUTPATIENT
Start: 2025-05-01 | End: 2026-05-01

## 2025-05-01 RX ORDER — PANTOPRAZOLE SODIUM 40 MG/1
40 TABLET, DELAYED RELEASE ORAL DAILY
Qty: 30 TABLET | Refills: 3 | Status: SHIPPED | OUTPATIENT
Start: 2025-05-01

## 2025-05-16 DIAGNOSIS — E55.9 VITAMIN D DEFICIENCY: ICD-10-CM

## 2025-05-16 DIAGNOSIS — D50.8 OTHER IRON DEFICIENCY ANEMIAS: ICD-10-CM

## 2025-05-16 DIAGNOSIS — K74.69 OTHER CIRRHOSIS OF LIVER (HCC): Primary | ICD-10-CM

## 2025-05-27 ENCOUNTER — LAB (OUTPATIENT)
Dept: INTERNAL MEDICINE CLINIC | Facility: CLINIC | Age: 55
End: 2025-05-27

## 2025-05-27 DIAGNOSIS — Z72.0 TOBACCO ABUSE: ICD-10-CM

## 2025-05-27 DIAGNOSIS — D64.9 ANEMIA, UNSPECIFIED TYPE: ICD-10-CM

## 2025-05-27 DIAGNOSIS — I10 PRIMARY HYPERTENSION: ICD-10-CM

## 2025-05-27 DIAGNOSIS — E78.2 MIXED HYPERLIPIDEMIA: ICD-10-CM

## 2025-05-27 LAB
ALBUMIN SERPL-MCNC: 3.2 G/DL (ref 3.5–5)
ALBUMIN/GLOB SERPL: 0.8 (ref 1–1.9)
ALP SERPL-CCNC: 171 U/L (ref 40–129)
ALT SERPL-CCNC: 29 U/L (ref 8–55)
AMMONIA PLAS-SCNC: 82 UMOL/L (ref 16–60)
ANION GAP SERPL CALC-SCNC: 12 MMOL/L (ref 7–16)
AST SERPL-CCNC: 46 U/L (ref 15–37)
BILIRUB SERPL-MCNC: 0.5 MG/DL (ref 0–1.2)
BUN SERPL-MCNC: 14 MG/DL (ref 6–23)
CALCIUM SERPL-MCNC: 9.9 MG/DL (ref 8.8–10.2)
CHLORIDE SERPL-SCNC: 105 MMOL/L (ref 98–107)
CHOLEST SERPL-MCNC: 155 MG/DL (ref 0–200)
CO2 SERPL-SCNC: 27 MMOL/L (ref 20–29)
CREAT SERPL-MCNC: 1.06 MG/DL (ref 0.8–1.3)
ERYTHROCYTE [DISTWIDTH] IN BLOOD BY AUTOMATED COUNT: 20.1 % (ref 11.9–14.6)
EST. AVERAGE GLUCOSE BLD GHB EST-MCNC: 123 MG/DL
GLOBULIN SER CALC-MCNC: 3.9 G/DL (ref 2.3–3.5)
GLUCOSE SERPL-MCNC: 132 MG/DL (ref 70–99)
HBA1C MFR BLD: 5.9 % (ref 0–5.6)
HCT VFR BLD AUTO: 41.3 % (ref 41.1–50.3)
HDLC SERPL-MCNC: 28 MG/DL (ref 40–60)
HDLC SERPL: 5.6 (ref 0–5)
HGB BLD-MCNC: 14.6 G/DL (ref 13.6–17.2)
LDLC SERPL CALC-MCNC: 83 MG/DL (ref 0–100)
MCH RBC QN AUTO: 31.7 PG (ref 26.1–32.9)
MCHC RBC AUTO-ENTMCNC: 35.4 G/DL (ref 31.4–35)
MCV RBC AUTO: 89.8 FL (ref 82–102)
NRBC # BLD: 0 K/UL (ref 0–0.2)
PLATELET # BLD AUTO: 125 K/UL (ref 150–450)
PMV BLD AUTO: ABNORMAL FL (ref 9.4–12.3)
POTASSIUM SERPL-SCNC: 3.5 MMOL/L (ref 3.5–5.1)
PROT SERPL-MCNC: 7.2 G/DL (ref 6.3–8.2)
RBC # BLD AUTO: 4.6 M/UL (ref 4.23–5.6)
SODIUM SERPL-SCNC: 143 MMOL/L (ref 136–145)
TRIGL SERPL-MCNC: 222 MG/DL (ref 0–150)
TSH, 3RD GENERATION: 1.47 UIU/ML (ref 0.27–4.2)
VLDLC SERPL CALC-MCNC: 44 MG/DL (ref 6–23)
WBC # BLD AUTO: 9 K/UL (ref 4.3–11.1)

## 2025-05-29 ENCOUNTER — RESULTS FOLLOW-UP (OUTPATIENT)
Dept: INTERNAL MEDICINE CLINIC | Facility: CLINIC | Age: 55
End: 2025-05-29

## 2025-06-12 ENCOUNTER — OFFICE VISIT (OUTPATIENT)
Dept: INTERNAL MEDICINE CLINIC | Facility: CLINIC | Age: 55
End: 2025-06-12
Payer: COMMERCIAL

## 2025-06-12 VITALS
DIASTOLIC BLOOD PRESSURE: 72 MMHG | HEIGHT: 68 IN | WEIGHT: 179.8 LBS | RESPIRATION RATE: 16 BRPM | BODY MASS INDEX: 27.25 KG/M2 | OXYGEN SATURATION: 95 % | SYSTOLIC BLOOD PRESSURE: 104 MMHG | HEART RATE: 69 BPM

## 2025-06-12 DIAGNOSIS — Z72.0 TOBACCO ABUSE: ICD-10-CM

## 2025-06-12 DIAGNOSIS — D64.9 ANEMIA, UNSPECIFIED TYPE: ICD-10-CM

## 2025-06-12 DIAGNOSIS — E78.2 MIXED HYPERLIPIDEMIA: ICD-10-CM

## 2025-06-12 DIAGNOSIS — K72.90 DECOMPENSATED CIRRHOSIS (HCC): ICD-10-CM

## 2025-06-12 DIAGNOSIS — I10 PRIMARY HYPERTENSION: Primary | ICD-10-CM

## 2025-06-12 DIAGNOSIS — R79.89 INCREASED AMMONIA LEVEL: ICD-10-CM

## 2025-06-12 DIAGNOSIS — Z87.898 HISTORY OF SYNCOPE: ICD-10-CM

## 2025-06-12 DIAGNOSIS — K74.60 DECOMPENSATED CIRRHOSIS (HCC): ICD-10-CM

## 2025-06-12 DIAGNOSIS — C67.9 MALIGNANT NEOPLASM OF URINARY BLADDER, UNSPECIFIED SITE (HCC): ICD-10-CM

## 2025-06-12 DIAGNOSIS — D50.8 OTHER IRON DEFICIENCY ANEMIAS: ICD-10-CM

## 2025-06-12 DIAGNOSIS — K70.30 ALCOHOLIC CIRRHOSIS, UNSPECIFIED WHETHER ASCITES PRESENT (HCC): ICD-10-CM

## 2025-06-12 DIAGNOSIS — Z87.898 HISTORY OF SEIZURES: ICD-10-CM

## 2025-06-12 PROCEDURE — 99214 OFFICE O/P EST MOD 30 MIN: CPT | Performed by: NURSE PRACTITIONER

## 2025-06-12 PROCEDURE — 3078F DIAST BP <80 MM HG: CPT | Performed by: NURSE PRACTITIONER

## 2025-06-12 PROCEDURE — 3074F SYST BP LT 130 MM HG: CPT | Performed by: NURSE PRACTITIONER

## 2025-06-12 RX ORDER — FUROSEMIDE 40 MG/1
40 TABLET ORAL 2 TIMES DAILY
Qty: 60 TABLET | Refills: 1 | Status: SHIPPED | OUTPATIENT
Start: 2025-06-12 | End: 2026-06-12

## 2025-06-12 RX ORDER — FERROUS SULFATE 325(65) MG
325 TABLET ORAL 2 TIMES DAILY
Qty: 60 TABLET | Refills: 5 | Status: SHIPPED | OUTPATIENT
Start: 2025-06-12

## 2025-06-12 RX ORDER — PANTOPRAZOLE SODIUM 40 MG/1
40 TABLET, DELAYED RELEASE ORAL DAILY
Qty: 30 TABLET | Refills: 3 | Status: SHIPPED | OUTPATIENT
Start: 2025-06-12

## 2025-06-12 NOTE — ASSESSMENT & PLAN NOTE
Discussed diet for lipids  Decrease fried food    Orders:    Hemoglobin A1C; Future    Lipid Panel; Future    Comprehensive Metabolic Panel; Future    CBC; Future    Ammonia; Future

## 2025-06-12 NOTE — PROGRESS NOTES
Gibran Paniagua (:  1970) is a 54 y.o. male,Established patient, here for evaluation of the following chief complaint(s):  Follow-up (3 month f/u)         Assessment & Plan  Primary hypertension   Bp lower today    Orders:    Hemoglobin A1C; Future    Lipid Panel; Future    Comprehensive Metabolic Panel; Future    CBC; Future    Ammonia; Future    Alcoholic cirrhosis, unspecified whether ascites present (HCC)   Off alcohol 6 months  LFT a little more elevated  F/u with gastro    Orders:    External Referral To Gastroenterology    Mixed hyperlipidemia   Discussed diet for lipids  Decrease fried food    Orders:    Hemoglobin A1C; Future    Lipid Panel; Future    Comprehensive Metabolic Panel; Future    CBC; Future    Ammonia; Future    Other iron deficiency anemias   Anemia improving, continue iron    Orders:    Hemoglobin A1C; Future    Lipid Panel; Future    Comprehensive Metabolic Panel; Future    CBC; Future    Ammonia; Future    External Referral To Hematology Oncology    Increased ammonia level   Has been \"slacking\" on his lactulose - take consistently      Orders:    External Referral To Gastroenterology    Anemia, unspecified type   Improving, continue iron    Orders:    Hemoglobin A1C; Future    Lipid Panel; Future    Comprehensive Metabolic Panel; Future    CBC; Future    Ammonia; Future    History of seizures   History of seizures as a child, thinks he had seizure or absence situation a month ago but declines work up -  he doesn't drive           History of syncope   Declines cardiac work up=   Had episode where he blacked out at work no fall  He was holding a door and couldn't let go - doesn't remember anything until sitting in a chair         Decompensated cirrhosis (HCC)       Orders:    External Referral To Gastroenterology    Malignant neoplasm of urinary bladder, unspecified site (HCC)       Orders:    External Referral to Urology    Tobacco abuse   Advise smoking cessation, he has rhonchi

## 2025-06-12 NOTE — ASSESSMENT & PLAN NOTE
Bp lower today    Orders:    Hemoglobin A1C; Future    Lipid Panel; Future    Comprehensive Metabolic Panel; Future    CBC; Future    Ammonia; Future

## 2025-06-12 NOTE — ASSESSMENT & PLAN NOTE
Off alcohol 6 months  LFT a little more elevated  F/u with gastro    Orders:    External Referral To Gastroenterology

## 2025-06-12 NOTE — ASSESSMENT & PLAN NOTE
Anemia improving, continue iron    Orders:    Hemoglobin A1C; Future    Lipid Panel; Future    Comprehensive Metabolic Panel; Future    CBC; Future    Ammonia; Future    External Referral To Hematology Oncology

## 2025-07-17 RX ORDER — FUROSEMIDE 40 MG/1
40 TABLET ORAL 2 TIMES DAILY
Qty: 60 TABLET | Refills: 1 | Status: SHIPPED | OUTPATIENT
Start: 2025-07-17 | End: 2026-07-17

## (undated) DEVICE — SYRINGE MED 10ML LUERLOCK TIP W/O SFTY DISP

## (undated) DEVICE — KENDALL RADIOLUCENT FOAM MONITORING ELECTRODE RECTANGULAR SHAPE: Brand: KENDALL

## (undated) DEVICE — AIRLIFE™ OXYGEN TUBING 7 FEET (2.1 M) CRUSH RESISTANT OXYGEN TUBING, VINYL TIPPED: Brand: AIRLIFE™

## (undated) DEVICE — SYRINGE MED 3ML CLR PLAS STD N CTRL LUERLOCK TIP DISP

## (undated) DEVICE — SINGLE PORT MANIFOLD: Brand: NEPTUNE 2

## (undated) DEVICE — SNARE POLYP SM W13MMXL240CM SHTH DIA2.4MM OVL FLX DISP

## (undated) DEVICE — FORCEP RAD JAW W/NEEDLE 160CM

## (undated) DEVICE — SYRINGE, LUER SLIP, STERILE, 60ML: Brand: MEDLINE

## (undated) DEVICE — YANKAUER,BULB TIP,W/O VENT,RIGID,STERILE: Brand: MEDLINE

## (undated) DEVICE — CONTAINER FORMALIN PFILLED 10% NBF 40ML

## (undated) DEVICE — NEEDLE SYR 18GA L1.5IN RED PLAS HUB S STL BLNT FILL W/O

## (undated) DEVICE — BLOCK BITE AD 60FR W/ VELC STRP ADDRESSES MOST PT AND

## (undated) DEVICE — LUBE JELLY FOIL PACK 1.4 OZ: Brand: MEDLINE INDUSTRIES, INC.

## (undated) DEVICE — GAUZE,SPONGE,4"X4",12PLY,WOVEN,NS,LF: Brand: MEDLINE

## (undated) DEVICE — CANNULA NSL ORAL AD FOR CAPNOFLEX CO2 O2 AIRLFE

## (undated) DEVICE — CONNECTOR TBNG OD5-7MM O2 END DISP